# Patient Record
Sex: MALE | URBAN - NONMETROPOLITAN AREA
[De-identification: names, ages, dates, MRNs, and addresses within clinical notes are randomized per-mention and may not be internally consistent; named-entity substitution may affect disease eponyms.]

---

## 2020-10-15 ENCOUNTER — HISTORICAL (OUTPATIENT)
Dept: ADMINISTRATIVE | Facility: HOSPITAL | Age: 65
End: 2020-10-15

## 2022-09-21 ENCOUNTER — HISTORICAL (OUTPATIENT)
Dept: ADMINISTRATIVE | Facility: HOSPITAL | Age: 67
End: 2022-09-21

## 2024-07-13 ENCOUNTER — HOSPITAL ENCOUNTER (INPATIENT)
Facility: HOSPITAL | Age: 69
LOS: 3 days | Discharge: HOME | DRG: 690 | End: 2024-07-16
Attending: SURGERY | Admitting: INTERNAL MEDICINE
Payer: MEDICARE

## 2024-07-13 DIAGNOSIS — R55 NEAR SYNCOPE: ICD-10-CM

## 2024-07-13 DIAGNOSIS — E66.01 CLASS 3 SEVERE OBESITY WITH BODY MASS INDEX (BMI) OF 40.0 TO 44.9 IN ADULT, UNSPECIFIED OBESITY TYPE, UNSPECIFIED WHETHER SERIOUS COMORBIDITY PRESENT: ICD-10-CM

## 2024-07-13 DIAGNOSIS — A41.9 SEPSIS: ICD-10-CM

## 2024-07-13 DIAGNOSIS — A41.9 SEPSIS SECONDARY TO UTI: Primary | ICD-10-CM

## 2024-07-13 DIAGNOSIS — Z90.79 HISTORY OF PROSTATECTOMY: ICD-10-CM

## 2024-07-13 DIAGNOSIS — F17.200 TOBACCO DEPENDENCY: ICD-10-CM

## 2024-07-13 DIAGNOSIS — N39.0 SEPSIS SECONDARY TO UTI: Primary | ICD-10-CM

## 2024-07-13 PROBLEM — I10 HTN (HYPERTENSION): Status: ACTIVE | Noted: 2024-07-13

## 2024-07-13 PROBLEM — E87.6 HYPOKALEMIA: Status: ACTIVE | Noted: 2024-07-13

## 2024-07-13 LAB
ALBUMIN SERPL-MCNC: 4.2 G/DL (ref 3.4–5)
ALBUMIN/GLOB SERPL: 1.2 RATIO
ALP SERPL-CCNC: 68 UNIT/L (ref 50–144)
ALT SERPL-CCNC: 27 UNIT/L (ref 1–45)
ANION GAP SERPL CALC-SCNC: 10 MEQ/L (ref 2–13)
AST SERPL-CCNC: 31 UNIT/L (ref 17–59)
BACTERIA #/AREA URNS AUTO: ABNORMAL /HPF
BASE EXCESS BLD CALC-SCNC: 4 MMOL/L (ref -2–2)
BASOPHILS # BLD AUTO: 0.04 X10(3)/MCL (ref 0.01–0.08)
BASOPHILS NFR BLD AUTO: 0.3 % (ref 0.1–1.2)
BILIRUB SERPL-MCNC: 1.1 MG/DL (ref 0–1)
BILIRUB UR QL STRIP.AUTO: NEGATIVE
BLOOD GAS SAMPLE TYPE (OHS): ABNORMAL
BUN SERPL-MCNC: 19 MG/DL (ref 7–20)
CALCIUM SERPL-MCNC: 9.3 MG/DL (ref 8.4–10.2)
CHLORIDE SERPL-SCNC: 97 MMOL/L (ref 98–110)
CLARITY UR: ABNORMAL
CO2 SERPL-SCNC: 27 MMOL/L (ref 21–32)
COHGB MFR BLDA: 1.6 % (ref 0–1.5)
COLOR UR AUTO: YELLOW
CREAT SERPL-MCNC: 1.14 MG/DL (ref 0.66–1.25)
CREAT/UREA NIT SERPL: 17 (ref 12–20)
EOSINOPHIL # BLD AUTO: 0.01 X10(3)/MCL (ref 0.04–0.54)
EOSINOPHIL NFR BLD AUTO: 0.1 % (ref 0.7–7)
ERYTHROCYTE [DISTWIDTH] IN BLOOD BY AUTOMATED COUNT: 13.5 %
GFR SERPLBLD CREATININE-BSD FMLA CKD-EPI: 69 ML/MIN/1.73/M2
GLOBULIN SER-MCNC: 3.6 GM/DL (ref 2–3.9)
GLUCOSE SERPL-MCNC: 118 MG/DL (ref 70–115)
GLUCOSE UR QL STRIP: NEGATIVE
HCO3 BLDA-SCNC: 27.9 MMOL/L (ref 22–26)
HCT VFR BLD AUTO: 40 % (ref 36–52)
HGB BLD-MCNC: 14.1 G/DL (ref 13–18)
HGB UR QL STRIP: ABNORMAL
IMM GRANULOCYTES # BLD AUTO: 0.06 X10(3)/MCL (ref 0–0.03)
IMM GRANULOCYTES NFR BLD AUTO: 0.5 % (ref 0–0.5)
INHALED O2 CONCENTRATION: 24 %
IP (OHS): 0 CMH2O
KETONES UR QL STRIP: NEGATIVE
LACTATE SERPL-SCNC: 1.1 MMOL/L (ref 0.4–2)
LEUKOCYTE ESTERASE UR QL STRIP: ABNORMAL
LPM (OHS): 1
LYMPHOCYTES # BLD AUTO: 0.84 X10(3)/MCL (ref 1.32–3.57)
LYMPHOCYTES NFR BLD AUTO: 7 % (ref 20–55)
MCH RBC QN AUTO: 29.9 PG (ref 27–34)
MCHC RBC AUTO-ENTMCNC: 35.3 G/DL (ref 31–37)
MCV RBC AUTO: 84.7 FL (ref 79–99)
METHGB MFR BLDA: 0 % (ref 0–1.5)
MODE (OHS): AC
MONOCYTES # BLD AUTO: 0.78 X10(3)/MCL (ref 0.3–0.82)
MONOCYTES NFR BLD AUTO: 6.5 % (ref 4.7–12.5)
NEUTROPHILS # BLD AUTO: 10.21 X10(3)/MCL (ref 1.78–5.38)
NEUTROPHILS NFR BLD AUTO: 85.6 % (ref 37–73)
NITRITE UR QL STRIP: NEGATIVE
NRBC BLD AUTO-RTO: 0 %
OXYGEN DEVICE BLOOD GAS (OHS): ABNORMAL
PAW @ PEAK INSP FLOW SETTING VENT: 0 CMH20
PCO2 BLDA: 44.3 MMHG (ref 35–45)
PH BLDA: 7.42 [PH] (ref 7.35–7.45)
PH UR STRIP: 6 [PH]
PLATELET # BLD AUTO: 161 X10(3)/MCL (ref 140–371)
PMV BLD AUTO: 9.2 FL (ref 9.4–12.4)
PO2 BLDA: 73 MMHG (ref 80–105)
POTASSIUM SERPL-SCNC: 3 MMOL/L (ref 3.5–5.1)
PROT SERPL-MCNC: 7.8 GM/DL (ref 6.3–8.2)
PROT UR QL STRIP: ABNORMAL
RBC # BLD AUTO: 4.72 X10(6)/MCL (ref 4–6)
RBC #/AREA URNS AUTO: ABNORMAL /HPF
SAO2 % BLDA: 95.6 % (ref 95–100)
SODIUM SERPL-SCNC: 134 MMOL/L (ref 136–145)
SP GR UR STRIP.AUTO: 1.02 (ref 1–1.03)
SQUAMOUS #/AREA URNS AUTO: ABNORMAL /HPF
UROBILINOGEN UR STRIP-ACNC: 0.2
WBC # BLD AUTO: 11.94 X10(3)/MCL (ref 4–11.5)
WBC #/AREA URNS AUTO: >100 /HPF

## 2024-07-13 PROCEDURE — 21400001 HC TELEMETRY ROOM

## 2024-07-13 PROCEDURE — 87040 BLOOD CULTURE FOR BACTERIA: CPT | Performed by: SURGERY

## 2024-07-13 PROCEDURE — 93005 ELECTROCARDIOGRAM TRACING: CPT

## 2024-07-13 PROCEDURE — 87086 URINE CULTURE/COLONY COUNT: CPT | Performed by: SURGERY

## 2024-07-13 PROCEDURE — 63600175 PHARM REV CODE 636 W HCPCS: Performed by: SURGERY

## 2024-07-13 PROCEDURE — 99285 EMERGENCY DEPT VISIT HI MDM: CPT | Mod: 25

## 2024-07-13 PROCEDURE — 96361 HYDRATE IV INFUSION ADD-ON: CPT

## 2024-07-13 PROCEDURE — 93010 ELECTROCARDIOGRAM REPORT: CPT | Mod: ,,, | Performed by: INTERNAL MEDICINE

## 2024-07-13 PROCEDURE — 81015 MICROSCOPIC EXAM OF URINE: CPT | Performed by: SURGERY

## 2024-07-13 PROCEDURE — 83605 ASSAY OF LACTIC ACID: CPT | Performed by: SURGERY

## 2024-07-13 PROCEDURE — 99900035 HC TECH TIME PER 15 MIN (STAT)

## 2024-07-13 PROCEDURE — 81003 URINALYSIS AUTO W/O SCOPE: CPT | Performed by: SURGERY

## 2024-07-13 PROCEDURE — 82803 BLOOD GASES ANY COMBINATION: CPT

## 2024-07-13 PROCEDURE — 80053 COMPREHEN METABOLIC PANEL: CPT | Performed by: SURGERY

## 2024-07-13 PROCEDURE — 85025 COMPLETE CBC W/AUTO DIFF WBC: CPT | Performed by: SURGERY

## 2024-07-13 PROCEDURE — 36600 WITHDRAWAL OF ARTERIAL BLOOD: CPT

## 2024-07-13 PROCEDURE — 96374 THER/PROPH/DIAG INJ IV PUSH: CPT

## 2024-07-13 PROCEDURE — 25000003 PHARM REV CODE 250: Performed by: SURGERY

## 2024-07-13 RX ORDER — POTASSIUM CHLORIDE 20 MEQ/1
40 TABLET, EXTENDED RELEASE ORAL ONCE
Status: COMPLETED | OUTPATIENT
Start: 2024-07-13 | End: 2024-07-13

## 2024-07-13 RX ORDER — ACETAMINOPHEN 325 MG/1
650 TABLET ORAL EVERY 6 HOURS PRN
Status: DISCONTINUED | OUTPATIENT
Start: 2024-07-14 | End: 2024-07-16 | Stop reason: HOSPADM

## 2024-07-13 RX ORDER — AMLODIPINE BESYLATE 10 MG/1
10 TABLET ORAL DAILY
COMMUNITY

## 2024-07-13 RX ORDER — LISINOPRIL AND HYDROCHLOROTHIAZIDE 20; 25 MG/1; MG/1
1 TABLET ORAL DAILY
Status: DISCONTINUED | OUTPATIENT
Start: 2024-07-14 | End: 2024-07-14

## 2024-07-13 RX ORDER — DESVENLAFAXINE SUCCINATE 50 MG/1
100 TABLET, EXTENDED RELEASE ORAL DAILY
Status: DISCONTINUED | OUTPATIENT
Start: 2024-07-14 | End: 2024-07-14

## 2024-07-13 RX ORDER — METOPROLOL TARTRATE 50 MG/1
50 TABLET ORAL 2 TIMES DAILY
COMMUNITY

## 2024-07-13 RX ORDER — SODIUM CHLORIDE 9 MG/ML
INJECTION, SOLUTION INTRAVENOUS CONTINUOUS
Status: DISCONTINUED | OUTPATIENT
Start: 2024-07-14 | End: 2024-07-16 | Stop reason: HOSPADM

## 2024-07-13 RX ORDER — IBUPROFEN 400 MG/1
400 TABLET ORAL EVERY 6 HOURS PRN
Status: DISCONTINUED | OUTPATIENT
Start: 2024-07-13 | End: 2024-07-13

## 2024-07-13 RX ORDER — FERROUS SULFATE, DRIED 160(50) MG
1 TABLET, EXTENDED RELEASE ORAL 2 TIMES DAILY WITH MEALS
Status: DISCONTINUED | OUTPATIENT
Start: 2024-07-14 | End: 2024-07-16 | Stop reason: HOSPADM

## 2024-07-13 RX ORDER — LEVOFLOXACIN 5 MG/ML
500 INJECTION, SOLUTION INTRAVENOUS
Status: DISCONTINUED | OUTPATIENT
Start: 2024-07-14 | End: 2024-07-13

## 2024-07-13 RX ORDER — IBUPROFEN 200 MG
1 TABLET ORAL DAILY
Status: DISCONTINUED | OUTPATIENT
Start: 2024-07-14 | End: 2024-07-14

## 2024-07-13 RX ORDER — ONDANSETRON HYDROCHLORIDE 2 MG/ML
4 INJECTION, SOLUTION INTRAVENOUS EVERY 6 HOURS PRN
Status: DISCONTINUED | OUTPATIENT
Start: 2024-07-13 | End: 2024-07-16 | Stop reason: HOSPADM

## 2024-07-13 RX ORDER — DESVENLAFAXINE 100 MG/1
200 TABLET, EXTENDED RELEASE ORAL NIGHTLY
COMMUNITY

## 2024-07-13 RX ORDER — SODIUM CHLORIDE 0.9 % (FLUSH) 0.9 %
3 SYRINGE (ML) INJECTION EVERY 6 HOURS
Status: DISCONTINUED | OUTPATIENT
Start: 2024-07-14 | End: 2024-07-13 | Stop reason: CLARIF

## 2024-07-13 RX ORDER — TALC
6 POWDER (GRAM) TOPICAL NIGHTLY PRN
Status: DISCONTINUED | OUTPATIENT
Start: 2024-07-13 | End: 2024-07-16 | Stop reason: HOSPADM

## 2024-07-13 RX ORDER — AMLODIPINE BESYLATE 5 MG/1
10 TABLET ORAL DAILY
Status: DISCONTINUED | OUTPATIENT
Start: 2024-07-14 | End: 2024-07-16 | Stop reason: HOSPADM

## 2024-07-13 RX ORDER — PHENTERMINE HYDROCHLORIDE 37.5 MG/1
37.5 CAPSULE ORAL EVERY MORNING
COMMUNITY

## 2024-07-13 RX ORDER — MELOXICAM 15 MG/1
15 TABLET ORAL DAILY
COMMUNITY

## 2024-07-13 RX ORDER — ENOXAPARIN SODIUM 100 MG/ML
40 INJECTION SUBCUTANEOUS EVERY 24 HOURS
Status: DISCONTINUED | OUTPATIENT
Start: 2024-07-13 | End: 2024-07-15 | Stop reason: SDUPTHER

## 2024-07-13 RX ORDER — METOPROLOL TARTRATE 50 MG/1
50 TABLET ORAL 2 TIMES DAILY
Status: DISCONTINUED | OUTPATIENT
Start: 2024-07-14 | End: 2024-07-16 | Stop reason: HOSPADM

## 2024-07-13 RX ORDER — MELOXICAM 7.5 MG/1
15 TABLET ORAL DAILY
Status: DISCONTINUED | OUTPATIENT
Start: 2024-07-14 | End: 2024-07-16 | Stop reason: HOSPADM

## 2024-07-13 RX ORDER — FERROUS SULFATE, DRIED 160(50) MG
1 TABLET, EXTENDED RELEASE ORAL 2 TIMES DAILY WITH MEALS
COMMUNITY

## 2024-07-13 RX ORDER — LISINOPRIL AND HYDROCHLOROTHIAZIDE 20; 25 MG/1; MG/1
1 TABLET ORAL DAILY
Status: ON HOLD | COMMUNITY
End: 2024-07-16 | Stop reason: HOSPADM

## 2024-07-13 RX ADMIN — SODIUM CHLORIDE 1000 ML: 9 INJECTION, SOLUTION INTRAVENOUS at 07:07

## 2024-07-13 RX ADMIN — CEFTRIAXONE SODIUM 1 G: 1 INJECTION, POWDER, FOR SOLUTION INTRAMUSCULAR; INTRAVENOUS at 08:07

## 2024-07-13 RX ADMIN — ENOXAPARIN SODIUM 40 MG: 40 INJECTION SUBCUTANEOUS at 11:07

## 2024-07-13 RX ADMIN — SODIUM CHLORIDE 1000 ML: 9 INJECTION, SOLUTION INTRAVENOUS at 08:07

## 2024-07-13 RX ADMIN — POTASSIUM CHLORIDE 40 MEQ: 1500 TABLET, EXTENDED RELEASE ORAL at 07:07

## 2024-07-14 LAB
ABS NEUT CALC (OHS): 7.28 X10(3)/MCL (ref 2.1–9.2)
ANION GAP SERPL CALC-SCNC: 6 MEQ/L (ref 2–13)
BUN SERPL-MCNC: 15 MG/DL (ref 7–20)
CALCIUM SERPL-MCNC: 8.5 MG/DL (ref 8.4–10.2)
CHLORIDE SERPL-SCNC: 102 MMOL/L (ref 98–110)
CO2 SERPL-SCNC: 26 MMOL/L (ref 21–32)
CREAT SERPL-MCNC: 0.91 MG/DL (ref 0.66–1.25)
CREAT/UREA NIT SERPL: 16 (ref 12–20)
ERYTHROCYTE [DISTWIDTH] IN BLOOD BY AUTOMATED COUNT: 13.8 %
GFR SERPLBLD CREATININE-BSD FMLA CKD-EPI: >90 ML/MIN/1.73/M2
GLUCOSE SERPL-MCNC: 156 MG/DL (ref 70–115)
HCT VFR BLD AUTO: 35.9 % (ref 36–52)
HGB BLD-MCNC: 12.5 G/DL (ref 13–18)
LYMPHOCYTES NFR BLD MANUAL: 1.29 X10(3)/MCL
LYMPHOCYTES NFR BLD MANUAL: 15 % (ref 20–55)
MCH RBC QN AUTO: 30.3 PG (ref 27–34)
MCHC RBC AUTO-ENTMCNC: 34.8 G/DL (ref 31–37)
MCV RBC AUTO: 86.9 FL (ref 79–99)
NEUTROPHILS NFR BLD MANUAL: 85 % (ref 37–73)
NRBC BLD AUTO-RTO: 0 %
PLATELET # BLD AUTO: 149 X10(3)/MCL (ref 140–371)
PMV BLD AUTO: 9.4 FL (ref 9.4–12.4)
POTASSIUM SERPL-SCNC: 3.1 MMOL/L (ref 3.5–5.1)
RBC # BLD AUTO: 4.13 X10(6)/MCL (ref 4–6)
SODIUM SERPL-SCNC: 134 MMOL/L (ref 136–145)
WBC # BLD AUTO: 8.57 X10(3)/MCL (ref 4–11.5)

## 2024-07-14 PROCEDURE — 94761 N-INVAS EAR/PLS OXIMETRY MLT: CPT

## 2024-07-14 PROCEDURE — 85025 COMPLETE CBC W/AUTO DIFF WBC: CPT | Performed by: FAMILY MEDICINE

## 2024-07-14 PROCEDURE — S4991 NICOTINE PATCH NONLEGEND: HCPCS | Performed by: INTERNAL MEDICINE

## 2024-07-14 PROCEDURE — 25000003 PHARM REV CODE 250: Performed by: INTERNAL MEDICINE

## 2024-07-14 PROCEDURE — 63600175 PHARM REV CODE 636 W HCPCS: Performed by: SURGERY

## 2024-07-14 PROCEDURE — 25000003 PHARM REV CODE 250: Performed by: FAMILY MEDICINE

## 2024-07-14 PROCEDURE — 27000221 HC OXYGEN, UP TO 24 HOURS

## 2024-07-14 PROCEDURE — 25000003 PHARM REV CODE 250: Performed by: SURGERY

## 2024-07-14 PROCEDURE — 80048 BASIC METABOLIC PNL TOTAL CA: CPT | Performed by: FAMILY MEDICINE

## 2024-07-14 PROCEDURE — 36415 COLL VENOUS BLD VENIPUNCTURE: CPT | Performed by: FAMILY MEDICINE

## 2024-07-14 PROCEDURE — 63600175 PHARM REV CODE 636 W HCPCS: Performed by: FAMILY MEDICINE

## 2024-07-14 PROCEDURE — 99900035 HC TECH TIME PER 15 MIN (STAT)

## 2024-07-14 PROCEDURE — 21400001 HC TELEMETRY ROOM

## 2024-07-14 RX ORDER — POTASSIUM CHLORIDE 20 MEQ/1
40 TABLET, EXTENDED RELEASE ORAL 2 TIMES DAILY
Status: COMPLETED | OUTPATIENT
Start: 2024-07-14 | End: 2024-07-15

## 2024-07-14 RX ORDER — LISINOPRIL 10 MG/1
20 TABLET ORAL DAILY
Status: DISCONTINUED | OUTPATIENT
Start: 2024-07-14 | End: 2024-07-16 | Stop reason: HOSPADM

## 2024-07-14 RX ORDER — DESVENLAFAXINE SUCCINATE 50 MG/1
200 TABLET, EXTENDED RELEASE ORAL NIGHTLY
Status: DISCONTINUED | OUTPATIENT
Start: 2024-07-15 | End: 2024-07-16 | Stop reason: HOSPADM

## 2024-07-14 RX ORDER — HYDROCHLOROTHIAZIDE 25 MG/1
25 TABLET ORAL DAILY
Status: DISCONTINUED | OUTPATIENT
Start: 2024-07-14 | End: 2024-07-15

## 2024-07-14 RX ADMIN — LISINOPRIL 20 MG: 10 TABLET ORAL at 08:07

## 2024-07-14 RX ADMIN — NICOTINE 1 PATCH: 14 PATCH, EXTENDED RELEASE TRANSDERMAL at 08:07

## 2024-07-14 RX ADMIN — POTASSIUM CHLORIDE 40 MEQ: 1500 TABLET, EXTENDED RELEASE ORAL at 06:07

## 2024-07-14 RX ADMIN — SODIUM CHLORIDE: 9 INJECTION, SOLUTION INTRAVENOUS at 10:07

## 2024-07-14 RX ADMIN — ENOXAPARIN SODIUM 40 MG: 40 INJECTION SUBCUTANEOUS at 04:07

## 2024-07-14 RX ADMIN — SODIUM CHLORIDE: 9 INJECTION, SOLUTION INTRAVENOUS at 08:07

## 2024-07-14 RX ADMIN — Medication 1 TABLET: at 08:07

## 2024-07-14 RX ADMIN — METOPROLOL TARTRATE 50 MG: 50 TABLET, FILM COATED ORAL at 08:07

## 2024-07-14 RX ADMIN — ACETAMINOPHEN 650 MG: 325 TABLET, FILM COATED ORAL at 07:07

## 2024-07-14 RX ADMIN — AMLODIPINE BESYLATE 10 MG: 5 TABLET ORAL at 08:07

## 2024-07-14 RX ADMIN — Medication 1 TABLET: at 05:07

## 2024-07-14 RX ADMIN — MELOXICAM 15 MG: 7.5 TABLET ORAL at 08:07

## 2024-07-14 RX ADMIN — HYDROCHLOROTHIAZIDE 25 MG: 25 TABLET ORAL at 08:07

## 2024-07-14 RX ADMIN — SODIUM CHLORIDE: 9 INJECTION, SOLUTION INTRAVENOUS at 12:07

## 2024-07-14 RX ADMIN — ACETAMINOPHEN 650 MG: 325 TABLET, FILM COATED ORAL at 04:07

## 2024-07-14 RX ADMIN — DESVENLAFAXINE 100 MG: 50 TABLET, FILM COATED, EXTENDED RELEASE ORAL at 08:07

## 2024-07-14 RX ADMIN — CEFTRIAXONE SODIUM 1 G: 1 INJECTION, POWDER, FOR SOLUTION INTRAMUSCULAR; INTRAVENOUS at 08:07

## 2024-07-14 NOTE — PLAN OF CARE
Problem: Adult Inpatient Plan of Care  Goal: Plan of Care Review  Outcome: Progressing  Goal: Patient-Specific Goal (Individualized)  Outcome: Progressing  Goal: Absence of Hospital-Acquired Illness or Injury  Outcome: Progressing  Goal: Optimal Comfort and Wellbeing  Outcome: Progressing  Goal: Readiness for Transition of Care  Outcome: Progressing     Problem: Bariatric Environmental Safety  Goal: Safety Maintained with Care  Outcome: Progressing     Problem: Fall Injury Risk  Goal: Absence of Fall and Fall-Related Injury  Outcome: Progressing     Problem: UTI (Urinary Tract Infection)  Goal: Improved Infection Symptoms  Outcome: Progressing    Just admitted this shift to room 210;afebrile;denies pain;urinary frequency noted;meds administered as ordered per md

## 2024-07-14 NOTE — H&P
[facility]    History & Physical      Patient Name: Jonathan Castro  MRN: [id number]  Admission Date: 7/13/2024  Attending Physician: Niocle Colón MD   Primary Care Provider: No primary care provider on file.         Patient information was obtained from patient and ER records.     Subjective:     Principal Problem:<principal problem not specified>    Chief Complaint:   Chief Complaint   Patient presents with    Fever    Urinary Frequency    Loss of Consciousness     Pt presented to ED per POV with c/o FEVER, urinary frequency and urgency and near syncope episode on last night at home. Pt did fall and hit head, pt denies use of blood thinners. Pt has hx of Prostate CA with prostectomy 5 years ago.         HPI: The patient is a 68-year-old man with a history of prostatectomy who presents with fever and hypotension. Blood pressure was initially 80s and 90s cystic on admission. He did get 2 L of fluids with improved his vital signs. Patient has found to have a significant urinary tract infection. Chest x-ray and lactic acid are both negative. He does complain of increased and continence associated with the UTI. He did get Rocephin and gentamycin in the emergency department. Currently feels more comfortable.    Past Medical History:   Diagnosis Date    Hypercholesteremia     Hypertension        Past Surgical History:   Procedure Laterality Date    ANGIOGRAM, [place]      PROSTATECTOMY         Review of patient's allergies indicates:  No Known Allergies    No current facility-administered medications on file prior to encounter.     Current Outpatient Medications on File Prior to Encounter   Medication Sig    amLODIPine (NORVASC) 10 MG tablet Take 10 mg by mouth once daily.    calcium-vitamin D3 (OS-BRAXTON 500 + D3) 500 mg-5 mcg (200 unit) per tablet Take 1 tablet by mouth 2 (two) times daily with meals.    desvenlafaxine succinate (PRISTIQ) 100 MG Tb24 Take 100 mg by mouth once daily.     "lisinopriL-hydrochlorothiazide (PRINZIDE,ZESTORETIC) 20-25 mg Tab Take 1 tablet by mouth once daily.    meloxicam (MOBIC) 15 MG tablet Take 15 mg by mouth once daily.    metoprolol tartrate (LOPRESSOR) 50 MG tablet Take 50 mg by mouth 2 (two) times daily.    phentermine 37.5 MG capsule Take 37.5 mg by mouth every morning.     Family History    None       Tobacco Use    Smoking status: Some Days     Types: Cigars     Passive exposure: Never    Smokeless tobacco: Never   Substance and Sexual Activity    Alcohol use: Yes     Comment: occasional    Drug use: Never    Sexual activity: Not on file     Review of Systems 10 point review of systems is performed and is otherwise negative unless stated as above.   Objective:     Vital Signs (Most Recent):  Temp: 98.1 °F (36.7 °C) (07/13/24 2319)  Pulse: 60 (07/13/24 2248)  Resp: 14 (07/13/24 2248)  BP: 108/64 (07/13/24 2248)  SpO2: 97 % (07/13/24 2248) Vital Signs (24h Range):  Temp:  [98.1 °F (36.7 °C)-98.4 °F (36.9 °C)] 98.1 °F (36.7 °C)  Pulse:  [58-68] 60  Resp:  [14-23] 14  SpO2:  [94 %-97 %] 97 %  BP: ()/(51-64) 108/64     Weight: 124.7 kg (275 lb)  Body mass index is 43.07 kg/m².    Physical Exam   /64   Pulse 60   Temp 98.1 °F (36.7 °C)   Resp 14   Ht 5' 7" (1.702 m)   Wt 124.7 kg (275 lb)   SpO2 97%   BMI 43.07 kg/m²     General Appearance:  Alert, cooperative, no distress, appears stated age   Head:  Normocephalic, without obvious abnormality, atraumatic   Eyes:  PERRL, conjunctiva/corneas clear, EOM's intact, fundi benign, both eyes   Ears:  Normal TM's and external ear canals, both ears   Nose: Nares normal, septum midline, mucosa normal, no drainage or sinus tenderness   Throat: Lips, mucosa, and tongue normal; teeth and gums normal   Neck: Supple, symmetrical, trachea midline, no adenopathy, thyroid: not enlarged, symmetric, no tenderness/mass/nodules, no carotid bruit or JVD   Back:   Symmetric, no curvature, ROM normal, no CVA tenderness "   Lungs:   Clear to auscultation bilaterally, respirations unlabored   Chest Wall:  No tenderness or deformity   Heart:  Regular rate and rhythm, S1, S2 normal, no murmur, rub or gallop   Abdomen:   Soft, non-tender, bowel sounds active all four quadrants,  no masses, no organomegaly   Genitalia:  Normal male   Rectal:  Normal tone, normal prostate, no masses or tenderness;  guaiac negative stool   Extremities: Extremities normal, atraumatic, no cyanosis or edema   Pulses: 2+ and symmetric   Skin: Skin color, texture, turgor normal, no rashes or lesions   Lymph nodes: Cervical, supraclavicular, and axillary nodes normal   Neurologic: Normal         Significant Labs: All pertinent labs within the past 24 hours have been reviewed.  Recent Lab Results         07/13/24 2143 07/13/24 2125 07/13/24 1957 07/13/24  1909        Albumin/Globulin Ratio       1.2       Albumin       4.2       ALP       68       ALT       27       Anion Gap       10.0       Appearance, UA   Cloudy           AST       31       Bacteria, UA   Few           Baso #       0.04       Basophil %       0.3       Bilirubin (UA)   Negative           BILIRUBIN TOTAL       1.1       BUN       19       BUN/CREAT RATIO       17       Calcium       9.3       Chloride       97       CO2       27       Color, UA   Yellow           Creatinine       1.14       eGFR       69  Comment:                      EGFR INTERPRETATION    Beginning 8/15/22 we are reporting the eGFRcr calculation as recommended by the [employer]. The eGFRcr equation has similar overall performance characteristics to the older equation, but the values may differ by more than 10% particularly at higher values of eGFRcr and younger adult ages.    NKF stages of chronic kidney disease (CKD)  Stage 1: Kidney damage with normal or increased eGFR (>90 mL/min/1.73 m^2)  Stage 2: Mild reduction in GFR (60-89 mL/min/1.73 m^2)  Stage 3a: Moderate reduction in GFR (45-59  mL/min/1.73 m^2)  Stage 3b: Moderate reduction in GFR (30-44 mL/min/1.73 m^2)  Stage 4: Severe reduction in GFR (15-29 mL/min/1.73 m^2)  Stage 5: Kidney failure (GFR <15 mL/min/1.73 m^2)           Eos #       0.01       Eos %       0.1       FIO2, Blood gas 24.0             Globulin, Total       3.6       Glucose       118       Glucose, UA   Negative           Hematocrit       40.0       Hemoglobin       14.1       Immature Grans (Abs)       0.06       Immature Granulocytes       0.5       IP 0.0             Ketones, UA   Negative           Lactic Acid Level     1.1         Leukocyte Esterase, UA   Small           LPM 1.0             Lymph #       0.84       LYMPH %       7.0       MCH       29.9       MCHC       35.3       MCV       84.7       MODE AC             Mono #       0.78       Mono %       6.5       MPV       9.2       Neut #       10.21       Neut %       85.6       NITRITE UA   Negative           nRBC       0.0       Blood, UA   Moderate           Oxygen Device, Blood gas Cannula             pH, UA   6.0           PIP 0             Platelet Count       161       Base Excess, Blood gas 4.00             CO Hgb 1.6             POC HCO3 27.9             Met Hgb 0.0             POC PCO2 44.3             POC PH 7.424             POC PO2 73.0             Potassium       3.0       PROTEIN TOTAL       7.8       Protein, UA   Trace           RBC       4.72       RBC, UA   None Seen           RDW       13.5       Sample Type Arterial Blood             sO2, Blood gas 95.6             Sodium       134       Specific Gravity,UA   1.020           Squam Epithel, UA   Occasional           Urobilinogen, UA   0.2           WBC, UA   >100           WBC       11.94               Significant Imaging: I have reviewed all pertinent imaging results/findings within the past 24 hours.  I have reviewed and interpreted all pertinent imaging results/findings within the past 24 hours.    Assessment/Plan:     Active Diagnoses:     Diagnosis Date Noted POA    UTI (urinary tract infection) [N39.0] 07/13/2024 Unknown    Hypokalemia [E87.6] 07/13/2024 Unknown    HTN (hypertension) [I10] 07/13/2024 Unknown     - IV Rocephin  - Follow urine cx  - Replace K  - Reconcile home meds as warranted  - Pain, nausea control, IVF repletion ongoing  - Follow clinical course    This encounter was completed via telemedicine (audio/video) w/ nursing at bedside ot assist w/ clinical exam.  SOC Audio/Visual Equipment is using HIPPA Compliant Web Platform. The patient is located in the hospital and the provider is located off site. This patient is placed in inpatient status under my care.       Participants on Call: Bedside RN, Patient, Physician on Call.   Problems Resolved During this Admission:     VTE Risk Mitigation (From admission, onward)           Ordered     enoxaparin injection 40 mg  Daily         07/13/24 2247     IP VTE HIGH RISK PATIENT  Once         07/13/24 2247                      Damon Boyd MD  Department of Hospital Medicine   [facility]

## 2024-07-14 NOTE — PLAN OF CARE
Problem: Adult Inpatient Plan of Care  Goal: Plan of Care Review  Outcome: Progressing  Goal: Patient-Specific Goal (Individualized)  Outcome: Progressing  Goal: Absence of Hospital-Acquired Illness or Injury  Outcome: Progressing  Goal: Optimal Comfort and Wellbeing  Outcome: Progressing  Goal: Readiness for Transition of Care  Outcome: Progressing     Problem: Bariatric Environmental Safety  Goal: Safety Maintained with Care  Outcome: Progressing     Problem: Fall Injury Risk  Goal: Absence of Fall and Fall-Related Injury  Outcome: Progressing     Problem: UTI (Urinary Tract Infection)  Goal: Improved Infection Symptoms  Outcome: Progressing

## 2024-07-14 NOTE — ED PROVIDER NOTES
Encounter Date: 7/13/2024       History     Chief Complaint   Patient presents with    Fever    Urinary Frequency    Loss of Consciousness     Pt presented to ED per POV with c/o FEVER, urinary frequency and urgency and near syncope episode on last night at home. Pt did fall and hit head, pt denies use of blood thinners. Pt has hx of Prostate CA with prostectomy 5 years ago.      67 YO WM W/ KNOWN PRIOR Hx/o PROSTATECTOMY & POSTOP PROSTATECTOMY URINARY INCONTINENCE PRESENTING W/ ACUTE ONSET URINARY INCONTINENCE FREQUENCY ASSOCIATED W/ FEVER .3.  NO EMESIS.  +NAUSEA.  NO AB PAIN.  +NEAR SYNCOPE W/OUT SYNCOPE.  NO CP.  NO SOB.  NO TRUE VERTIGO.  NO TINNITUS.  NO CHANGE AUDITORY OR VISUAL ACUITY.  +FEVER TREATED WITH ASPIRIN (DISCUSSED NOT USING ASPIRIN WITH FEVER).  +HTN.  +HLD-HCHOL.  NO Hx/o MI/CVA/DVT/PE.        Review of patient's allergies indicates:  No Known Allergies  Past Medical History:   Diagnosis Date    Hypercholesteremia     Hypertension      Past Surgical History:   Procedure Laterality Date    ANGIOGRAM, [place]      PROSTATECTOMY       No family history on file.  Social History     Tobacco Use    Smoking status: Some Days     Types: Cigars     Passive exposure: Never    Smokeless tobacco: Never   Substance Use Topics    Alcohol use: Yes     Comment: occasional    Drug use: Never     Review of Systems   All other systems reviewed and are negative.      Physical Exam     Initial Vitals [07/13/24 1840]   BP Pulse Resp Temp SpO2   (!) 96/51 68 18 98.3 °F (36.8 °C) (!) 94 %      MAP       --         Physical Exam    Nursing note and vitals reviewed.  Constitutional: He appears well-developed and well-nourished.   HENT:   Head: Normocephalic and atraumatic.   Right Ear: External ear normal.   Left Ear: External ear normal.   Nose: Nose normal.   Mouth/Throat: Oropharynx is clear and moist.   Eyes: Conjunctivae and EOM are normal. Pupils are equal, round, and reactive to light.   Neck: Neck supple. No  thyromegaly present. No tracheal deviation present. No JVD present.   Normal range of motion.  Cardiovascular:  Normal rate, regular rhythm, normal heart sounds and intact distal pulses.     Exam reveals no gallop.       No murmur heard.  Pulmonary/Chest: Breath sounds normal. No stridor. No respiratory distress. He has no wheezes. He has no rhonchi. He has no rales.   Abdominal: Abdomen is soft. Bowel sounds are normal. He exhibits no distension. There is no abdominal tenderness. There is no rebound and no guarding.   Musculoskeletal:         General: Normal range of motion.      Cervical back: Normal range of motion and neck supple.     Neurological: He is alert and oriented to person, place, and time. He has normal strength. No cranial nerve deficit or sensory deficit. GCS score is 15. GCS eye subscore is 4. GCS verbal subscore is 5. GCS motor subscore is 6.   Skin: Skin is warm. Capillary refill takes less than 2 seconds.   Psychiatric: He has a normal mood and affect. His behavior is normal. Judgment and thought content normal.         ED Course   Procedures  Labs Reviewed   URINALYSIS, REFLEX TO URINE CULTURE - Abnormal; Notable for the following components:       Result Value    Appearance, UA Cloudy (*)     Protein, UA Trace (*)     Blood, UA Moderate (*)     Leukocyte Esterase, UA Small (*)     All other components within normal limits    Narrative:      URINE STABILITY IS 2 HOURS AT ROOM TEMP OR    SIX HOURS REFRIGERATED. PERFORMING TESTING ON    SPECIMENS GREATER THAN THIS AGE MAY AFFECT THE    FOLLOWING TESTS:    PH          SPECIFIC GRAVITY           BLOOD    CLARITY     BILIRUBIN               UROBILINOGEN   COMPREHENSIVE METABOLIC PANEL - Abnormal; Notable for the following components:    Sodium 134 (*)     Potassium 3.0 (*)     Chloride 97 (*)     Glucose 118 (*)     Bilirubin Total 1.1 (*)     All other components within normal limits   CBC WITH DIFFERENTIAL - Abnormal; Notable for the following  components:    WBC 11.94 (*)     MPV 9.2 (*)     Neut % 85.6 (*)     Lymph % 7.0 (*)     Eos % 0.1 (*)     Lymph # 0.84 (*)     Neut # 10.21 (*)     Eos # 0.01 (*)     IG# 0.06 (*)     All other components within normal limits   BLOOD GAS - Abnormal; Notable for the following components:    pO2, Blood gas 73.0 (*)     CO Hgb 1.6 (*)     HCO3, Blood gas 27.9 (*)     Base Excess, Blood gas 4.00 (*)     All other components within normal limits   URINALYSIS, MICROSCOPIC - Abnormal; Notable for the following components:    Bacteria, UA Few (*)     WBC, UA >100 (*)     All other components within normal limits   LACTIC ACID, PLASMA - Normal   BLOOD CULTURE [facility]   BLOOD CULTURE [facility]   CULTURE, URINE   CBC W/ AUTO DIFFERENTIAL    Narrative:     The following orders were created for panel order CBC auto differential.  Procedure                               Abnormality         Status                     ---------                               -----------         ------                     CBC with Differential[[id number]]       Abnormal            Final result                 Please view results for these tests on the individual orders.          Imaging Results              X-Ray Chest AP Portable (Final result)  Result time 07/13/24 20:24:01      Final result by Thomas Ritter MD (07/13/24 20:24:01)                   Impression:      Shallow depth of inspiration exaggerating the interstitial markings, the cardiac silhouette as well as the pulmonary vasculature.  This also precludes the exclusion of mild interstitial edema.      Electronically signed by: Thomas Ritter  Date:    07/13/2024  Time:    20:24               Narrative:    EXAMINATION:  XR CHEST AP PORTABLE    CLINICAL HISTORY:  FEVER W/ HYPOXIA;    TECHNIQUE:  Single frontal view of the chest was performed.    COMPARISON:  07/21/2022    FINDINGS:  The lungs are under expanded and there is exaggeration to the interstitial markings and the cardiac silhouette as  well as the pulmonary vasculature.No focal, lobar or segmental infiltrate or significant pleural effusion or pneumothorax is identified.    Moderate degenerative findings involve the spine.                                       Medications   gentamicin (GARAMYCIN) 450 mg in 0.9% NaCl 100 mL IVPB (has no administration in time range)   sodium chloride 0.9% flush 3 mL (has no administration in time range)   enoxaparin injection 40 mg (has no administration in time range)   ibuprofen tablet 400 mg (has no administration in time range)   ondansetron injection 4 mg (has no administration in time range)   melatonin tablet 6 mg (has no administration in time range)   0.9%  NaCl infusion (has no administration in time range)   levoFLOXacin 500 mg/100 mL IVPB 500 mg (has no administration in time range)   sodium chloride 0.9% bolus 1,000 mL 1,000 mL (0 mLs Intravenous Stopped 7/13/24 2013)   sodium chloride 0.9% bolus 1,000 mL 1,000 mL (0 mLs Intravenous Stopped 7/13/24 2145)   cefTRIAXone (Rocephin) 1 g in D5W 100 mL IVPB (MB+) (0 g Intravenous Stopped 7/13/24 2010)   potassium chloride SA CR tablet 40 mEq (40 mEq Oral Given 7/1953)     Medical Decision Making             ED Course as of 07/13/24 2248   Sat Jul 13, 2024 2008 ROOM AIR OXYGEN LEVEL 93%   [WV]   2214 116/62   [WV]   2243 DR ROMAN AGREES WITH ADMISSION [WV]      ED Course User Index  [WV] Myron Douglas MD                       TOTAL CRITICAL CARE TIME:  90 minutes    Clinical Impression:  Final diagnoses:  [A41.9, N39.0] Sepsis secondary to UTI (Primary)  [E66.01, Z68.41] Class 3 severe obesity with body mass index (BMI) of 40.0 to 44.9 in adult, unspecified obesity type, unspecified whether serious comorbidity present  [Z90.79] History of prostatectomy          ED Disposition Condition    Admit Stable                Myron Douglas MD  07/13/24 5360

## 2024-07-14 NOTE — PROGRESS NOTES
Hospital Medicine  Progress Note    Patient Name: Jonathan Castro  MRN: [id number]  Status: IP- Inpatient   Admission Date: 7/13/2024  Length of Stay: 1  Date of Service: 07/14/2024       CC: hospital follow-up for        SUBJECTIVE   68-year-old man with a history of prostatectomy who presents with fever and hypotension. Blood pressure was initially 80s and 90s cystic on admission. He did get 2 L of fluids with improved his vital signs. Patient has found to have a significant urinary tract infection. Chest x-ray and lactic acid are both negative. He does complain of increased and continence associated with the UTI. He did get Rocephin and gentamycin in the emergency department. Currently feels more comfortable.     07/14/2024  BP better   Culture pending  On iv rocephin for UTI    Today: Patient seen and examined at bedside, and chart reviewed.       MEDICATIONS   Scheduled   amLODIPine  10 mg Oral Daily    calcium-vitamin D3  1 tablet Oral BID WM    cefTRIAXone (Rocephin) IV (PEDS and ADULTS)  1 g Intravenous Q24H    desvenlafaxine succinate  100 mg Oral Daily    enoxparin  40 mg Subcutaneous Daily    hydroCHLOROthiazide  25 mg Oral Daily    lisinopriL  20 mg Oral Daily    meloxicam  15 mg Oral Daily    metoprolol tartrate  50 mg Oral BID    nicotine  1 patch Transdermal Daily     Continuous Infusions   0.9% NaCl   Intravenous Continuous 125 mL/hr at 07/14/24 0823 New Bag at 07/14/24 0823         PHYSICAL EXAM   VITALS: T 98.5 °F (36.9 °C)   /69   P (!) 54   RR 18   O2 (!) 94 %    GENERAL: Awake and in NAD  LUNGS: CTA B/L  CVS: Normal rate  GI/: Soft, NT, bowel sounds positive.  EXTREMITIES: No peripheral edema  NEURO: AAOx3  PSYCH: Cooperative      LABS   CBC  Recent Labs     07/13/24 1909 07/14/24  0902   WBC 11.94* 8.57   RBC 4.72 4.13   HGB 14.1 12.5*   HCT 40.0 35.9*   MCV 84.7 86.9   MCH 29.9 30.3   MCHC 35.3 34.8   RDW 13.5 13.8    149     CHEM  Recent Labs     07/13/24 1909 07/14/24  0902    * 134*   K 3.0* 3.1*   CO2 27 26   BUN 19 15   CREATININE 1.14 0.91   GLUCOSE 118* 156*   CALCIUM 9.3 8.5   ALBUMIN 4.2  --    GLOBULIN 3.6  --    ALKPHOS 68  --    ALT 27  --    AST 31  --    BILITOT 1.1*  --          MICROBIOLOGY     Microbiology Results (last 7 days)       Procedure Component Value Units Date/Time    Urine culture [[id number]] Collected: 07/13/24 2125    Order Status: Sent Specimen: Urine Updated: 07/13/24 2221    Blood Culture [[id number]] Collected: 07/13/24 1957    Order Status: Resulted Specimen: Blood from Ankle, Left Updated: 07/13/24 2025    Blood Culture [[id number]] Collected: 07/13/24 1957    Order Status: Resulted Specimen: Blood from Arm, Left Updated: 07/13/24 2024              DIAGNOSTICS   X-Ray Chest AP Portable  Narrative: EXAMINATION:  XR CHEST AP PORTABLE    CLINICAL HISTORY:  FEVER W/ HYPOXIA;    TECHNIQUE:  Single frontal view of the chest was performed.    COMPARISON:  07/21/2022    FINDINGS:  The lungs are under expanded and there is exaggeration to the interstitial markings and the cardiac silhouette as well as the pulmonary vasculature.No focal, lobar or segmental infiltrate or significant pleural effusion or pneumothorax is identified.    Moderate degenerative findings involve the spine.  Impression: Shallow depth of inspiration exaggerating the interstitial markings, the cardiac silhouette as well as the pulmonary vasculature.  This also precludes the exclusion of mild interstitial edema.    Electronically signed by: Thomas Ritter  Date:    07/13/2024  Time:    20:24        ASSESSMENT      UTI (urinary tract infection) [N39.0] 07/13/2024 Unknown    Hypokalemia [E87.6] 07/13/2024 Unknown    HTN (hypertension) [I10] 07/13/2024 Unknown     PLAN     - cont IV Rocephin  - Follow urine cx  - Replace K  - Reconcile home meds as warranted  - Pain, nausea control, IVF repletion ongoing  - Follow clinical course      Prophylaxis: babar Das,  [facility]

## 2024-07-15 LAB
ANION GAP SERPL CALC-SCNC: 8 MEQ/L (ref 2–13)
BASOPHILS # BLD AUTO: 0.04 X10(3)/MCL (ref 0.01–0.08)
BASOPHILS NFR BLD AUTO: 0.4 % (ref 0.1–1.2)
BUN SERPL-MCNC: 13 MG/DL (ref 7–20)
CALCIUM SERPL-MCNC: 8.7 MG/DL (ref 8.4–10.2)
CHLORIDE SERPL-SCNC: 101 MMOL/L (ref 98–110)
CO2 SERPL-SCNC: 27 MMOL/L (ref 21–32)
CREAT SERPL-MCNC: 0.69 MG/DL (ref 0.66–1.25)
CREAT/UREA NIT SERPL: 19 (ref 12–20)
EOSINOPHIL # BLD AUTO: 0.22 X10(3)/MCL (ref 0.04–0.54)
EOSINOPHIL NFR BLD AUTO: 2.3 % (ref 0.7–7)
ERYTHROCYTE [DISTWIDTH] IN BLOOD BY AUTOMATED COUNT: 14.2 %
GFR SERPLBLD CREATININE-BSD FMLA CKD-EPI: >90 ML/MIN/1.73/M2
GLUCOSE SERPL-MCNC: 99 MG/DL (ref 70–115)
HCT VFR BLD AUTO: 37.9 % (ref 36–52)
HGB BLD-MCNC: 13.5 G/DL (ref 13–18)
IMM GRANULOCYTES # BLD AUTO: 0.04 X10(3)/MCL (ref 0–0.03)
IMM GRANULOCYTES NFR BLD AUTO: 0.4 % (ref 0–0.5)
LYMPHOCYTES # BLD AUTO: 1.96 X10(3)/MCL (ref 1.32–3.57)
LYMPHOCYTES NFR BLD AUTO: 20.5 % (ref 20–55)
MAGNESIUM SERPL-MCNC: 1.9 MG/DL (ref 1.8–2.4)
MCH RBC QN AUTO: 31.6 PG (ref 27–34)
MCHC RBC AUTO-ENTMCNC: 35.6 G/DL (ref 31–37)
MCV RBC AUTO: 88.8 FL (ref 79–99)
MONOCYTES # BLD AUTO: 0.96 X10(3)/MCL (ref 0.3–0.82)
MONOCYTES NFR BLD AUTO: 10.1 % (ref 4.7–12.5)
NEUTROPHILS # BLD AUTO: 6.33 X10(3)/MCL (ref 1.78–5.38)
NEUTROPHILS NFR BLD AUTO: 66.3 % (ref 37–73)
NRBC BLD AUTO-RTO: 0 %
OHS QRS DURATION: 96 MS
OHS QRS DURATION: 98 MS
OHS QTC CALCULATION: 410 MS
OHS QTC CALCULATION: 464 MS
PLATELET # BLD AUTO: 163 X10(3)/MCL (ref 140–371)
PLATELET # BLD EST: ADEQUATE 10*3/UL
PMV BLD AUTO: 10.3 FL (ref 9.4–12.4)
POTASSIUM SERPL-SCNC: 3.5 MMOL/L (ref 3.5–5.1)
RBC # BLD AUTO: 4.27 X10(6)/MCL (ref 4–6)
SODIUM SERPL-SCNC: 136 MMOL/L (ref 136–145)
WBC # BLD AUTO: 9.55 X10(3)/MCL (ref 4–11.5)

## 2024-07-15 PROCEDURE — 25000003 PHARM REV CODE 250: Performed by: FAMILY MEDICINE

## 2024-07-15 PROCEDURE — 36415 COLL VENOUS BLD VENIPUNCTURE: CPT | Performed by: FAMILY MEDICINE

## 2024-07-15 PROCEDURE — 85025 COMPLETE CBC W/AUTO DIFF WBC: CPT | Performed by: FAMILY MEDICINE

## 2024-07-15 PROCEDURE — 93010 ELECTROCARDIOGRAM REPORT: CPT | Mod: ,,, | Performed by: INTERNAL MEDICINE

## 2024-07-15 PROCEDURE — 63600175 PHARM REV CODE 636 W HCPCS: Performed by: FAMILY MEDICINE

## 2024-07-15 PROCEDURE — 80048 BASIC METABOLIC PNL TOTAL CA: CPT | Performed by: FAMILY MEDICINE

## 2024-07-15 PROCEDURE — 27000221 HC OXYGEN, UP TO 24 HOURS

## 2024-07-15 PROCEDURE — 83735 ASSAY OF MAGNESIUM: CPT | Performed by: INTERNAL MEDICINE

## 2024-07-15 PROCEDURE — 21400001 HC TELEMETRY ROOM

## 2024-07-15 PROCEDURE — 93005 ELECTROCARDIOGRAM TRACING: CPT

## 2024-07-15 PROCEDURE — 94761 N-INVAS EAR/PLS OXIMETRY MLT: CPT

## 2024-07-15 PROCEDURE — 25000003 PHARM REV CODE 250: Performed by: INTERNAL MEDICINE

## 2024-07-15 RX ORDER — POTASSIUM CHLORIDE 20 MEQ/1
40 TABLET, EXTENDED RELEASE ORAL ONCE
Status: DISCONTINUED | OUTPATIENT
Start: 2024-07-15 | End: 2024-07-16 | Stop reason: HOSPADM

## 2024-07-15 RX ORDER — ATORVASTATIN CALCIUM 10 MG/1
10 TABLET, FILM COATED ORAL NIGHTLY
COMMUNITY

## 2024-07-15 RX ADMIN — MELOXICAM 15 MG: 7.5 TABLET ORAL at 08:07

## 2024-07-15 RX ADMIN — POTASSIUM CHLORIDE 40 MEQ: 1500 TABLET, EXTENDED RELEASE ORAL at 08:07

## 2024-07-15 RX ADMIN — CEFTRIAXONE SODIUM 1 G: 1 INJECTION, POWDER, FOR SOLUTION INTRAMUSCULAR; INTRAVENOUS at 08:07

## 2024-07-15 RX ADMIN — SODIUM CHLORIDE: 9 INJECTION, SOLUTION INTRAVENOUS at 08:07

## 2024-07-15 RX ADMIN — ACETAMINOPHEN 650 MG: 325 TABLET, FILM COATED ORAL at 07:07

## 2024-07-15 RX ADMIN — APIXABAN 5 MG: 5 TABLET, FILM COATED ORAL at 08:07

## 2024-07-15 RX ADMIN — DESVENLAFAXINE 200 MG: 50 TABLET, FILM COATED, EXTENDED RELEASE ORAL at 08:07

## 2024-07-15 RX ADMIN — Medication 1 TABLET: at 08:07

## 2024-07-15 RX ADMIN — AMLODIPINE BESYLATE 10 MG: 5 TABLET ORAL at 08:07

## 2024-07-15 RX ADMIN — LISINOPRIL 20 MG: 10 TABLET ORAL at 08:07

## 2024-07-15 RX ADMIN — METOPROLOL TARTRATE 50 MG: 50 TABLET, FILM COATED ORAL at 08:07

## 2024-07-15 RX ADMIN — Medication 1 TABLET: at 06:07

## 2024-07-15 RX ADMIN — ACETAMINOPHEN 650 MG: 325 TABLET, FILM COATED ORAL at 08:07

## 2024-07-15 RX ADMIN — HYDROCHLOROTHIAZIDE 25 MG: 25 TABLET ORAL at 08:07

## 2024-07-15 NOTE — HPI
68-year-old man with a history of prostatectomy who presents with fever and hypotension. Blood pressure was initially 80s and 90s cystic on admission. He did get 2 L of fluids with improved his vital signs. Patient has found to have a significant urinary tract infection. Chest x-ray and lactic acid are both negative. He does complain of increased and continence associated with the UTI. He did get Rocephin and gentamycin in the emergency department. Currently feels more comfortable.

## 2024-07-15 NOTE — PLAN OF CARE
Problem: Adult Inpatient Plan of Care  Goal: Plan of Care Review  Outcome: Progressing  Goal: Patient-Specific Goal (Individualized)  Outcome: Progressing  Goal: Absence of Hospital-Acquired Illness or Injury  Outcome: Progressing  Goal: Optimal Comfort and Wellbeing  Outcome: Progressing  Goal: Readiness for Transition of Care  Outcome: Progressing     Problem: Bariatric Environmental Safety  Goal: Safety Maintained with Care  Outcome: Progressing     Problem: Fall Injury Risk  Goal: Absence of Fall and Fall-Related Injury  Outcome: Progressing     Problem: UTI (Urinary Tract Infection)  Goal: Improved Infection Symptoms  Outcome: Progressing    Afebrile tonight;no problems with urination;bradycardic on telemetry during this shift 40's-50's majority of the night while sleeping;slept well;meds administered as ordered per md

## 2024-07-15 NOTE — HOSPITAL COURSE
07/14/2024  BP better   Culture pending  On iv rocephin for UTI    07/15/2024 admitted with UTI, 24 hr urnine negative, UA looks abnormal, still running fever early this am, now afebrile.    Has had 2 doses of rocephin     Today: Patient seen and examined at bedside, and chart reviewed

## 2024-07-15 NOTE — ASSESSMENT & PLAN NOTE
Chronic, controlled. Latest blood pressure and vitals reviewed-     Temp:  [97.1 °F (36.2 °C)-101.6 °F (38.7 °C)]   Pulse:  [45-81]   Resp:  [18-20]   BP: (111-136)/(60-70)   SpO2:  [92 %-97 %] .   Home meds for hypertension were reviewed and noted below.   Hypertension Medications               amLODIPine (NORVASC) 10 MG tablet Take 10 mg by mouth once daily.    lisinopriL-hydrochlorothiazide (PRINZIDE,ZESTORETIC) 20-25 mg Tab Take 1 tablet by mouth once daily.    metoprolol tartrate (LOPRESSOR) 50 MG tablet Take 50 mg by mouth 2 (two) times daily.            While in the hospital, will manage blood pressure as follows; Continue home antihypertensive regimen    Will utilize p.r.n. blood pressure medication only if patient's blood pressure greater than 140/90 and he develops symptoms such as worsening chest pain or shortness of breath.

## 2024-07-15 NOTE — ASSESSMENT & PLAN NOTE
Patient has hypokalemia which is Acute and currently improving. Most recent potassium levels reviewed-   Lab Results   Component Value Date    K 3.5 07/15/2024   . Will continue potassium replacement per protocol and recheck repeat levels after replacement completed.

## 2024-07-15 NOTE — SUBJECTIVE & OBJECTIVE
Past Medical History:   Diagnosis Date    Hypercholesteremia     Hypertension     Sleep apnea, unspecified        Past Surgical History:   Procedure Laterality Date    ANGIOGRAM, [place]      CATARACT EXTRACTION      PROSTATECTOMY         Review of patient's allergies indicates:  No Known Allergies    No current facility-administered medications on file prior to encounter.     Current Outpatient Medications on File Prior to Encounter   Medication Sig    amLODIPine (NORVASC) 10 MG tablet Take 10 mg by mouth once daily.    calcium-vitamin D3 (OS-BRAXTON 500 + D3) 500 mg-5 mcg (200 unit) per tablet Take 1 tablet by mouth 2 (two) times daily with meals.    desvenlafaxine succinate (PRISTIQ) 100 MG Tb24 Take 200 mg by mouth nightly.    lisinopriL-hydrochlorothiazide (PRINZIDE,ZESTORETIC) 20-25 mg Tab Take 1 tablet by mouth once daily.    meloxicam (MOBIC) 15 MG tablet Take 15 mg by mouth once daily.    metoprolol tartrate (LOPRESSOR) 50 MG tablet Take 50 mg by mouth 2 (two) times daily.    phentermine 37.5 MG capsule Take 37.5 mg by mouth every morning.     Family History    None       Tobacco Use    Smoking status: Some Days     Types: Cigars     Passive exposure: Never    Smokeless tobacco: Never   Substance and Sexual Activity    Alcohol use: Yes     Comment: occasional    Drug use: Never    Sexual activity: Not on file     Review of Systems   Constitutional: Positive for fever and malaise/fatigue.   HENT: Negative.     Eyes: Negative.    Cardiovascular: Negative.    Respiratory: Negative.     Endocrine: Negative.    Hematologic/Lymphatic: Negative.    Skin: Negative.    Musculoskeletal:  Positive for arthritis.   Gastrointestinal: Negative.    Genitourinary:  Positive for dysuria.   Neurological:  Positive for weakness.   Psychiatric/Behavioral: Negative.     Allergic/Immunologic: Negative.      Objective:     Vital Signs (Most Recent):  Temp: 98.8 °F (37.1 °C) (07/15/24 1045)  Pulse: 68 (07/15/24 1045)  Resp: 18 (07/15/24  1045)  BP: 119/64 (07/15/24 1045)  SpO2: 95 % (07/15/24 1045) Vital Signs (24h Range):  Temp:  [97.1 °F (36.2 °C)-101.6 °F (38.7 °C)] 98.8 °F (37.1 °C)  Pulse:  [45-81] 68  Resp:  [18-20] 18  SpO2:  [92 %-97 %] 95 %  BP: (111-138)/(60-73) 119/64     Weight: 124.6 kg (274 lb 9.6 oz)  Body mass index is 43.01 kg/m².    SpO2: 95 %         Intake/Output Summary (Last 24 hours) at 7/15/2024 1258  Last data filed at 7/15/2024 0558  Gross per 24 hour   Intake 2386.25 ml   Output --   Net 2386.25 ml       Lines/Drains/Airways       Peripheral Intravenous Line  Duration                  Peripheral IV - Single Lumen 07/14/24 1228 22 G Anterior;Right Forearm 1 day                     Physical Exam  Constitutional:       General: He is not in acute distress.  HENT:      Head: Normocephalic and atraumatic.      Nose: No rhinorrhea.      Mouth/Throat:      Mouth: Mucous membranes are moist.   Eyes:      Extraocular Movements: Extraocular movements intact.      Pupils: Pupils are equal, round, and reactive to light.   Cardiovascular:      Rate and Rhythm: Normal rate.   Pulmonary:      Effort: Pulmonary effort is normal.      Breath sounds: Normal breath sounds.   Abdominal:      Palpations: Abdomen is soft.      Tenderness: There is no abdominal tenderness.   Musculoskeletal:      Cervical back: Neck supple.      Right lower leg: Edema present.      Left lower leg: Edema present.   Skin:     General: Skin is warm and dry.   Neurological:      General: No focal deficit present.   Psychiatric:         Mood and Affect: Mood normal.         Behavior: Behavior normal.          Significant Labs: CMP   Recent Labs   Lab 07/13/24  1909 07/14/24  0902 07/15/24  0502   * 134* 136   K 3.0* 3.1* 3.5   CL 97* 102 101   CO2 27 26 27   BUN 19 15 13   CREATININE 1.14 0.91 0.69   CALCIUM 9.3 8.5 8.7   ALBUMIN 4.2  --   --    BILITOT 1.1*  --   --    ALKPHOS 68  --   --    AST 31  --   --    ALT 27  --   --     and CBC   Recent Labs   Lab  07/13/24  1909 07/14/24  0902 07/15/24  0502   WBC 11.94* 8.57 9.55   HGB 14.1 12.5* 13.5   HCT 40.0 35.9* 37.9    149 163

## 2024-07-15 NOTE — ASSESSMENT & PLAN NOTE
Still with fever  WBC coming down and urine cx negative so far  No other symtpoms  Continue rocephin if additional fever will repeat blood cx and consider change iv abx

## 2024-07-15 NOTE — SUBJECTIVE & OBJECTIVE
Interval History:      Review of Systems   Constitutional:  Negative for appetite change, fatigue and fever.   Respiratory:  Negative for cough, shortness of breath and wheezing.    Cardiovascular:  Negative for chest pain and leg swelling.   Gastrointestinal:  Negative for abdominal distention, abdominal pain, constipation, diarrhea, nausea and vomiting.   Skin:  Negative for color change, pallor, rash and wound.   Neurological:  Negative for tremors, syncope and headaches.   Psychiatric/Behavioral:  Negative for agitation and behavioral problems.      Objective:     Vital Signs (Most Recent):  Temp: 99.4 °F (37.4 °C) (07/15/24 1500)  Pulse: 62 (07/15/24 1500)  Resp: 18 (07/15/24 1500)  BP: 123/65 (07/15/24 1500)  SpO2: (!) 94 % (07/15/24 1500) Vital Signs (24h Range):  Temp:  [97.1 °F (36.2 °C)-101.6 °F (38.7 °C)] 99.4 °F (37.4 °C)  Pulse:  [45-81] 62  Resp:  [18-20] 18  SpO2:  [92 %-97 %] 94 %  BP: (111-136)/(60-70) 123/65     Weight: 124.6 kg (274 lb 9.6 oz)  Body mass index is 43.01 kg/m².    Intake/Output Summary (Last 24 hours) at 7/15/2024 1543  Last data filed at 7/15/2024 1200  Gross per 24 hour   Intake 3346.25 ml   Output --   Net 3346.25 ml         Physical Exam  Constitutional:       General: He is not in acute distress.     Appearance: Normal appearance. He is normal weight. He is not ill-appearing.   HENT:      Head: Normocephalic and atraumatic.      Nose: Nose normal.   Eyes:      General: No scleral icterus.     Conjunctiva/sclera: Conjunctivae normal.   Cardiovascular:      Rate and Rhythm: Normal rate and regular rhythm.      Pulses: Normal pulses.      Heart sounds: Normal heart sounds.   Pulmonary:      Effort: Pulmonary effort is normal.      Breath sounds: Normal breath sounds.   Abdominal:      General: Abdomen is flat. Bowel sounds are normal.      Palpations: Abdomen is soft.   Skin:     General: Skin is warm and dry.      Findings: No erythema or rash.   Neurological:      General: No  focal deficit present.      Mental Status: He is alert and oriented to person, place, and time.   Psychiatric:         Mood and Affect: Mood normal.         Behavior: Behavior normal.         Thought Content: Thought content normal.             Significant Labs: All pertinent labs within the past 24 hours have been reviewed.  CBC:   Recent Labs   Lab 07/13/24 1909 07/14/24  0902 07/15/24  0502   WBC 11.94* 8.57 9.55   HGB 14.1 12.5* 13.5   HCT 40.0 35.9* 37.9    149 163     CMP:   Recent Labs   Lab 07/13/24 1909 07/14/24  0902 07/15/24  0502   * 134* 136   K 3.0* 3.1* 3.5   CL 97* 102 101   CO2 27 26 27   BUN 19 15 13   CREATININE 1.14 0.91 0.69   CALCIUM 9.3 8.5 8.7   ALBUMIN 4.2  --   --    BILITOT 1.1*  --   --    ALKPHOS 68  --   --    AST 31  --   --    ALT 27  --   --        Significant Imaging: I have reviewed all pertinent imaging results/findings within the past 24 hours.

## 2024-07-15 NOTE — NURSING
Noted telemetry strip was interpreted as afib;asked patient if he has a history of afib;he denies;EKG done & resulted as afib with slow ventricular response;telemed request sent

## 2024-07-15 NOTE — PLAN OF CARE
07/15/24 1250   Discharge Assessment   Assessment Type Discharge Planning Assessment   Confirmed/corrected address, phone number and insurance Yes   Confirmed Demographics Correct on Facesheet   Source of Information patient   When was your last doctors appointment?   (2 weeks ago)   Communicated NANCY with patient/caregiver Date not available/Unable to determine   Reason For Admission UTI   People in Home spouse   Facility Arrived From: home   Do you expect to return to your current living situation? Yes   Do you have help at home or someone to help you manage your care at home? Yes   Who are your caregiver(s) and their phone number(s)? wife Olga Castro [phone]   Prior to hospitilization cognitive status: Alert/Oriented   Current cognitive status: Alert/Oriented   Walking or Climbing Stairs Difficulty no   Dressing/Bathing Difficulty no   Home Layout Able to live on 1st floor   Equipment Currently Used at Home none   Readmission within 30 days? No   Patient currently being followed by outpatient case management? No   Do you currently have service(s) that help you manage your care at home? No   Do you take prescription medications? Yes   Do you have prescription coverage? Yes   Coverage [employer] [facility]   Do you have any problems affording any of your prescribed medications? No   Is the patient taking medications as prescribed? yes   Who is going to help you get home at discharge? wife   How do you get to doctors appointments? car, drives self   Are you on dialysis? No   Do you take coumadin? No   Discharge Plan A Home   DME Needed Upon Discharge  none   Discharge Plan discussed with: Patient   Transition of Care Barriers None   Physical Activity   On average, how many days per week do you engage in moderate to strenuous exercise (like a brisk walk)? 0 days   On average, how many minutes do you engage in exercise at this level? 0 min   Financial Resource Strain   How hard is it for you to pay for the very basics  like food, housing, medical care, and heating? Not hard   Housing Stability   In the last 12 months, was there a time when you were not able to pay the mortgage or rent on time? N   At any time in the past 12 months, were you homeless or living in a shelter (including now)? N   Transportation Needs   Has the lack of transportation kept you from medical appointments, meetings, work or from getting things needed for daily living? No   Food Insecurity   Within the past 12 months, you worried that your food would run out before you got the money to buy more. Never true   Within the past 12 months, the food you bought just didn't last and you didn't have money to get more. Never true   Stress   Do you feel stress - tense, restless, nervous, or anxious, or unable to sleep at night because your mind is troubled all the time - these days? Not at all   Social Isolation   How often do you feel lonely or isolated from those around you?  Never   Alcohol Use   Q1: How often do you have a drink containing alcohol? Never   Q2: How many drinks containing alcohol do you have on a typical day when you are drinking? None   Q3: How often do you have six or more drinks on one occasion? Never   Utilities   In the past 12 months has the electric, gas, oil, or water company threatened to shut off services in your home? No   Health Literacy   How often do you need to have someone help you when you read instructions, pamphlets, or other written material from your doctor or pharmacy? Never

## 2024-07-15 NOTE — PROGRESS NOTES
[facility]  Progress Note    Patient Name: Jonathan Castro  MRN: [id number]  Patient Class: IP- Inpatient   Admission Date: 7/13/2024  Length of Stay: 2 days  Attending Physician: Nicole Colón MD  Primary Care Provider: Dave Gomez MD        Subjective:     Principal Problem:<principal problem not specified>        HPI:  68-year-old man with a history of prostatectomy who presents with fever and hypotension. Blood pressure was initially 80s and 90s cystic on admission. He did get 2 L of fluids with improved his vital signs. Patient has found to have a significant urinary tract infection. Chest x-ray and lactic acid are both negative. He does complain of increased and continence associated with the UTI. He did get Rocephin and gentamycin in the emergency department. Currently feels more comfortable.           Overview/Hospital Course:  07/14/2024  BP better   Culture pending  On iv rocephin for UTI    07/15/2024 admitted with UTI, 24 hr urnine negative, UA looks abnormal, still running fever early this am, now afebrile.    Has had 2 doses of rocephin     Today: Patient seen and examined at bedside, and chart reviewed    Interval History:      Review of Systems   Constitutional:  Negative for appetite change, fatigue and fever.   Respiratory:  Negative for cough, shortness of breath and wheezing.    Cardiovascular:  Negative for chest pain and leg swelling.   Gastrointestinal:  Negative for abdominal distention, abdominal pain, constipation, diarrhea, nausea and vomiting.   Skin:  Negative for color change, pallor, rash and wound.   Neurological:  Negative for tremors, syncope and headaches.   Psychiatric/Behavioral:  Negative for agitation and behavioral problems.      Objective:     Vital Signs (Most Recent):  Temp: 99.4 °F (37.4 °C) (07/15/24 1500)  Pulse: 62 (07/15/24 1500)  Resp: 18 (07/15/24 1500)  BP: 123/65 (07/15/24 1500)  SpO2: (!) 94 % (07/15/24 1500) Vital  Signs (24h Range):  Temp:  [97.1 °F (36.2 °C)-101.6 °F (38.7 °C)] 99.4 °F (37.4 °C)  Pulse:  [45-81] 62  Resp:  [18-20] 18  SpO2:  [92 %-97 %] 94 %  BP: (111-136)/(60-70) 123/65     Weight: 124.6 kg (274 lb 9.6 oz)  Body mass index is 43.01 kg/m².    Intake/Output Summary (Last 24 hours) at 7/15/2024 1543  Last data filed at 7/15/2024 1200  Gross per 24 hour   Intake 3346.25 ml   Output --   Net 3346.25 ml         Physical Exam  Constitutional:       General: He is not in acute distress.     Appearance: Normal appearance. He is normal weight. He is not ill-appearing.   HENT:      Head: Normocephalic and atraumatic.      Nose: Nose normal.   Eyes:      General: No scleral icterus.     Conjunctiva/sclera: Conjunctivae normal.   Cardiovascular:      Rate and Rhythm: Normal rate and regular rhythm.      Pulses: Normal pulses.      Heart sounds: Normal heart sounds.   Pulmonary:      Effort: Pulmonary effort is normal.      Breath sounds: Normal breath sounds.   Abdominal:      General: Abdomen is flat. Bowel sounds are normal.      Palpations: Abdomen is soft.   Skin:     General: Skin is warm and dry.      Findings: No erythema or rash.   Neurological:      General: No focal deficit present.      Mental Status: He is alert and oriented to person, place, and time.   Psychiatric:         Mood and Affect: Mood normal.         Behavior: Behavior normal.         Thought Content: Thought content normal.             Significant Labs: All pertinent labs within the past 24 hours have been reviewed.  CBC:   Recent Labs   Lab 07/13/24 1909 07/14/24  0902 07/15/24  0502   WBC 11.94* 8.57 9.55   HGB 14.1 12.5* 13.5   HCT 40.0 35.9* 37.9    149 163     CMP:   Recent Labs   Lab 07/13/24 1909 07/14/24  0902 07/15/24  0502   * 134* 136   K 3.0* 3.1* 3.5   CL 97* 102 101   CO2 27 26 27   BUN 19 15 13   CREATININE 1.14 0.91 0.69   CALCIUM 9.3 8.5 8.7   ALBUMIN 4.2  --   --    BILITOT 1.1*  --   --    ALKPHOS 68  --   --     AST 31  --   --    ALT 27  --   --        Significant Imaging: I have reviewed all pertinent imaging results/findings within the past 24 hours.    Assessment/Plan:      Tobacco dependency  Dangers of cigarette smoking were reviewed with patient in detail. Patient was Counseled for 3-10 minutes. Nicotine replacement options were discussed. Nicotine replacement was discussed- not prescribed per patient's request    HTN (hypertension)  Chronic, controlled. Latest blood pressure and vitals reviewed-     Temp:  [97.1 °F (36.2 °C)-101.6 °F (38.7 °C)]   Pulse:  [45-81]   Resp:  [18-20]   BP: (111-136)/(60-70)   SpO2:  [92 %-97 %] .   Home meds for hypertension were reviewed and noted below.   Hypertension Medications               amLODIPine (NORVASC) 10 MG tablet Take 10 mg by mouth once daily.    lisinopriL-hydrochlorothiazide (PRINZIDE,ZESTORETIC) 20-25 mg Tab Take 1 tablet by mouth once daily.    metoprolol tartrate (LOPRESSOR) 50 MG tablet Take 50 mg by mouth 2 (two) times daily.            While in the hospital, will manage blood pressure as follows; Continue home antihypertensive regimen    Will utilize p.r.n. blood pressure medication only if patient's blood pressure greater than 140/90 and he develops symptoms such as worsening chest pain or shortness of breath.    Hypokalemia  Patient has hypokalemia which is Acute and currently improving. Most recent potassium levels reviewed-   Lab Results   Component Value Date    K 3.5 07/15/2024   . Will continue potassium replacement per protocol and recheck repeat levels after replacement completed.     UTI (urinary tract infection)  Still with fever  WBC coming down and urine cx negative so far  No other symtpoms  Continue rocephin if additional fever will repeat blood cx and consider change iv abx        VTE Risk Mitigation (From admission, onward)           Ordered     apixaban tablet 5 mg  2 times daily         07/15/24 0545     IP VTE HIGH RISK PATIENT  Once          07/13/24 2247                    Discharge Planning   NANCY: 7/17/2024     Code Status: Full Code   Is the patient medically ready for discharge?:     Reason for patient still in hospital (select all that apply): Patient trending condition, Laboratory test, and Treatment  Discharge Plan A: Home                  Nicole Colón MD  Department of Hospital Medicine   [facility]

## 2024-07-15 NOTE — CONSULTS
[facility]  Cardiology  Consult Note    Patient Name: Jonathan Castro  MRN: [id number]  Admission Date: 7/13/2024  Hospital Length of Stay: 2 days  Code Status: Full Code   Attending Provider: Dr Das  Consulting Provider: Minor Lin MD  Primary Care Physician: Dave Gomez MD  Principal Problem:<principal problem not specified>    Patient information was obtained from patient, MD notes.     Consults  Subjective:     68-year-old gentleman, local , personal history of hypertension, coronary angiogram 2022-normal vessels, BMI 43, dysuria, malaise fatigue, subjective fever at home for about 2 weeks.  Patient was admitted with UTI/urosepsis/covered empiric IV antibiotics/pan cultures.  Cardiology consulted for new onset of atrial fibrillation-rate control.  No reported symptomatic palpitation, PND, orthopnea, chest pain, syncope.      UTI/urosepsis   New onset atrial fibrillation  Hypokalemia   Essential hypertension   History of preserved LV function per echo  2022 coronary angiogram -normal vessels   BMI 43            Past Medical History:   Diagnosis Date    Hypercholesteremia     Hypertension     Sleep apnea, unspecified        Past Surgical History:   Procedure Laterality Date    ANGIOGRAM, [place]      CATARACT EXTRACTION      PROSTATECTOMY         Review of patient's allergies indicates:  No Known Allergies    No current facility-administered medications on file prior to encounter.     Current Outpatient Medications on File Prior to Encounter   Medication Sig    amLODIPine (NORVASC) 10 MG tablet Take 10 mg by mouth once daily.    calcium-vitamin D3 (OS-BRAXTON 500 + D3) 500 mg-5 mcg (200 unit) per tablet Take 1 tablet by mouth 2 (two) times daily with meals.    desvenlafaxine succinate (PRISTIQ) 100 MG Tb24 Take 200 mg by mouth nightly.    lisinopriL-hydrochlorothiazide (PRINZIDE,ZESTORETIC) 20-25 mg Tab Take 1 tablet by mouth once daily.    meloxicam (MOBIC) 15 MG tablet Take 15 mg by  mouth once daily.    metoprolol tartrate (LOPRESSOR) 50 MG tablet Take 50 mg by mouth 2 (two) times daily.    phentermine 37.5 MG capsule Take 37.5 mg by mouth every morning.     Family History    None       Tobacco Use    Smoking status: Some Days     Types: Cigars     Passive exposure: Never    Smokeless tobacco: Never   Substance and Sexual Activity    Alcohol use: Yes     Comment: occasional    Drug use: Never    Sexual activity: Not on file     Review of Systems   Constitutional: Positive for fever and malaise/fatigue.   HENT: Negative.     Eyes: Negative.    Cardiovascular: Negative.    Respiratory: Negative.     Endocrine: Negative.    Hematologic/Lymphatic: Negative.    Skin: Negative.    Musculoskeletal:  Positive for arthritis.   Gastrointestinal: Negative.    Genitourinary:  Positive for dysuria.   Neurological:  Positive for weakness.   Psychiatric/Behavioral: Negative.     Allergic/Immunologic: Negative.      Objective:     Vital Signs (Most Recent):  Temp: 98.8 °F (37.1 °C) (07/15/24 1045)  Pulse: 68 (07/15/24 1045)  Resp: 18 (07/15/24 1045)  BP: 119/64 (07/15/24 1045)  SpO2: 95 % (07/15/24 1045) Vital Signs (24h Range):  Temp:  [97.1 °F (36.2 °C)-101.6 °F (38.7 °C)] 98.8 °F (37.1 °C)  Pulse:  [45-81] 68  Resp:  [18-20] 18  SpO2:  [92 %-97 %] 95 %  BP: (111-138)/(60-73) 119/64     Weight: 124.6 kg (274 lb 9.6 oz)  Body mass index is 43.01 kg/m².    SpO2: 95 %         Intake/Output Summary (Last 24 hours) at 7/15/2024 1258  Last data filed at 7/15/2024 0558  Gross per 24 hour   Intake 2386.25 ml   Output --   Net 2386.25 ml       Lines/Drains/Airways       Peripheral Intravenous Line  Duration                  Peripheral IV - Single Lumen 07/14/24 1228 22 G Anterior;Right Forearm 1 day                     Physical Exam  Constitutional:       General: He is not in acute distress.  HENT:      Head: Normocephalic and atraumatic.      Nose: No rhinorrhea.      Mouth/Throat:      Mouth: Mucous membranes are  moist.   Eyes:      Extraocular Movements: Extraocular movements intact.      Pupils: Pupils are equal, round, and reactive to light.   Cardiovascular:      Rate and Rhythm: Irregular, irregular, + soft JAD lsb. No JVD  Pulmonary:      Effort: Pulmonary effort is normal.      Breath sounds: Normal breath sounds.   Abdominal:      Palpations: Abdomen is soft.      Tenderness: There is no abdominal tenderness.   Musculoskeletal:      Cervical back: Neck supple.      Right lower leg: mild  Edema present.      Left lower leg: mild Edema present.   Skin:     General: Skin is warm and dry.   Neurological:      General: No focal deficit present.   Psychiatric:         Mood and Affect: Mood normal.         Behavior: Behavior normal.          Significant Labs: CMP   Recent Labs   Lab 07/13/24 1909 07/14/24  0902 07/15/24  0502   * 134* 136   K 3.0* 3.1* 3.5   CL 97* 102 101   CO2 27 26 27   BUN 19 15 13   CREATININE 1.14 0.91 0.69   CALCIUM 9.3 8.5 8.7   ALBUMIN 4.2  --   --    BILITOT 1.1*  --   --    ALKPHOS 68  --   --    AST 31  --   --    ALT 27  --   --     and CBC   Recent Labs   Lab 07/13/24 1909 07/14/24  0902 07/15/24  0502   WBC 11.94* 8.57 9.55   HGB 14.1 12.5* 13.5   HCT 40.0 35.9* 37.9    149 163       EKG afib   Pcxr - no lobar infiltrate    Assessment and Plan:       UTI/urosepsis   New onset atrial fibrillation  Hypokalemia   Essential hypertension   History of preserved LV function per echo  2022 coronary angiogram -normal vessels   BMI 43    Rate controll afib  OAC - Eliquis started  K Supplement  Check magnesium level  Off HCTZ   Post D/C follow up     Appreciate Dr Das help , consult        Minor Lin MD  Cardiology   [facility]

## 2024-07-16 VITALS
SYSTOLIC BLOOD PRESSURE: 129 MMHG | TEMPERATURE: 98 F | OXYGEN SATURATION: 97 % | RESPIRATION RATE: 20 BRPM | HEIGHT: 67 IN | DIASTOLIC BLOOD PRESSURE: 76 MMHG | HEART RATE: 64 BPM | WEIGHT: 274.63 LBS | BODY MASS INDEX: 43.1 KG/M2

## 2024-07-16 PROBLEM — F17.200 TOBACCO DEPENDENCY: Status: RESOLVED | Noted: 2024-07-13 | Resolved: 2024-07-16

## 2024-07-16 LAB
ANION GAP SERPL CALC-SCNC: 8 MEQ/L (ref 2–13)
BACTERIA UR CULT: NO GROWTH
BASOPHILS # BLD AUTO: 0.03 X10(3)/MCL (ref 0.01–0.08)
BASOPHILS NFR BLD AUTO: 0.3 % (ref 0.1–1.2)
BUN SERPL-MCNC: 13 MG/DL (ref 7–20)
CALCIUM SERPL-MCNC: 9.2 MG/DL (ref 8.4–10.2)
CHLORIDE SERPL-SCNC: 98 MMOL/L (ref 98–110)
CO2 SERPL-SCNC: 29 MMOL/L (ref 21–32)
CREAT SERPL-MCNC: 0.78 MG/DL (ref 0.66–1.25)
CREAT/UREA NIT SERPL: 17 (ref 12–20)
EOSINOPHIL # BLD AUTO: 0.21 X10(3)/MCL (ref 0.04–0.54)
EOSINOPHIL NFR BLD AUTO: 2.2 % (ref 0.7–7)
ERYTHROCYTE [DISTWIDTH] IN BLOOD BY AUTOMATED COUNT: 14.1 %
GFR SERPLBLD CREATININE-BSD FMLA CKD-EPI: >90 ML/MIN/1.73/M2
GLUCOSE SERPL-MCNC: 118 MG/DL (ref 70–115)
HCT VFR BLD AUTO: 37.6 % (ref 36–52)
HGB BLD-MCNC: 12.8 G/DL (ref 13–18)
IMM GRANULOCYTES # BLD AUTO: 0.03 X10(3)/MCL (ref 0–0.03)
IMM GRANULOCYTES NFR BLD AUTO: 0.3 % (ref 0–0.5)
LYMPHOCYTES # BLD AUTO: 1.71 X10(3)/MCL (ref 1.32–3.57)
LYMPHOCYTES NFR BLD AUTO: 18 % (ref 20–55)
MCH RBC QN AUTO: 29.4 PG (ref 27–34)
MCHC RBC AUTO-ENTMCNC: 34 G/DL (ref 31–37)
MCV RBC AUTO: 86.2 FL (ref 79–99)
MONOCYTES # BLD AUTO: 0.98 X10(3)/MCL (ref 0.3–0.82)
MONOCYTES NFR BLD AUTO: 10.3 % (ref 4.7–12.5)
NEUTROPHILS # BLD AUTO: 6.56 X10(3)/MCL (ref 1.78–5.38)
NEUTROPHILS NFR BLD AUTO: 68.9 % (ref 37–73)
NRBC BLD AUTO-RTO: 0 %
PLATELET # BLD AUTO: 158 X10(3)/MCL (ref 140–371)
PMV BLD AUTO: 9.6 FL (ref 9.4–12.4)
POTASSIUM SERPL-SCNC: 3.4 MMOL/L (ref 3.5–5.1)
RBC # BLD AUTO: 4.36 X10(6)/MCL (ref 4–6)
SODIUM SERPL-SCNC: 135 MMOL/L (ref 136–145)
WBC # BLD AUTO: 9.52 X10(3)/MCL (ref 4–11.5)

## 2024-07-16 PROCEDURE — 25000003 PHARM REV CODE 250: Performed by: INTERNAL MEDICINE

## 2024-07-16 PROCEDURE — 27000221 HC OXYGEN, UP TO 24 HOURS

## 2024-07-16 PROCEDURE — 94761 N-INVAS EAR/PLS OXIMETRY MLT: CPT

## 2024-07-16 PROCEDURE — 25000003 PHARM REV CODE 250: Performed by: FAMILY MEDICINE

## 2024-07-16 PROCEDURE — 36415 COLL VENOUS BLD VENIPUNCTURE: CPT | Performed by: FAMILY MEDICINE

## 2024-07-16 PROCEDURE — 85025 COMPLETE CBC W/AUTO DIFF WBC: CPT | Performed by: FAMILY MEDICINE

## 2024-07-16 PROCEDURE — 80048 BASIC METABOLIC PNL TOTAL CA: CPT | Performed by: FAMILY MEDICINE

## 2024-07-16 RX ORDER — CEFDINIR 300 MG/1
300 CAPSULE ORAL 2 TIMES DAILY
Qty: 14 CAPSULE | Refills: 0 | Status: SHIPPED | OUTPATIENT
Start: 2024-07-16 | End: 2024-07-23

## 2024-07-16 RX ORDER — LISINOPRIL 20 MG/1
20 TABLET ORAL DAILY
Qty: 90 TABLET | Refills: 3 | Status: SHIPPED | OUTPATIENT
Start: 2024-07-17 | End: 2025-07-17

## 2024-07-16 RX ADMIN — AMLODIPINE BESYLATE 10 MG: 5 TABLET ORAL at 08:07

## 2024-07-16 RX ADMIN — LISINOPRIL 20 MG: 10 TABLET ORAL at 08:07

## 2024-07-16 RX ADMIN — APIXABAN 5 MG: 5 TABLET, FILM COATED ORAL at 08:07

## 2024-07-16 RX ADMIN — MELOXICAM 15 MG: 7.5 TABLET ORAL at 08:07

## 2024-07-16 RX ADMIN — ACETAMINOPHEN 650 MG: 325 TABLET, FILM COATED ORAL at 04:07

## 2024-07-16 RX ADMIN — METOPROLOL TARTRATE 50 MG: 50 TABLET, FILM COATED ORAL at 08:07

## 2024-07-16 RX ADMIN — Medication 1 TABLET: at 08:07

## 2024-07-16 NOTE — PLAN OF CARE
Problem: Adult Inpatient Plan of Care  Goal: Plan of Care Review  Outcome: Progressing  Goal: Patient-Specific Goal (Individualized)  Outcome: Progressing  Goal: Absence of Hospital-Acquired Illness or Injury  Outcome: Progressing  Goal: Optimal Comfort and Wellbeing  Outcome: Progressing  Goal: Readiness for Transition of Care  Outcome: Progressing     Problem: Bariatric Environmental Safety  Goal: Safety Maintained with Care  Outcome: Progressing     Problem: Fall Injury Risk  Goal: Absence of Fall and Fall-Related Injury  Outcome: Progressing     Problem: UTI (Urinary Tract Infection)  Goal: Improved Infection Symptoms  Outcome: Progressing     Problem: Comorbidity Management  Goal: Blood Pressure in Desired Range  Outcome: Progressing     Problem: Dysrhythmia  Goal: Normalized Cardiac Rhythm  Outcome: Progressing

## 2024-07-16 NOTE — SUBJECTIVE & OBJECTIVE
ROS  Objective:     Vital Signs (Most Recent):  Temp: 98.4 °F (36.9 °C) (07/16/24 0710)  Pulse: 64 (07/16/24 0833)  Resp: 20 (07/16/24 0733)  BP: 129/76 (07/16/24 0833)  SpO2: 97 % (07/16/24 0710) Vital Signs (24h Range):  Temp:  [98.1 °F (36.7 °C)-99.4 °F (37.4 °C)] 98.4 °F (36.9 °C)  Pulse:  [60-67] 64  Resp:  [18-22] 20  SpO2:  [94 %-97 %] 97 %  BP: (123-143)/(54-76) 129/76     Weight: 124.6 kg (274 lb 9.6 oz)  Body mass index is 43.01 kg/m².     SpO2: 97 %         Intake/Output Summary (Last 24 hours) at 7/16/2024 1405  Last data filed at 7/16/2024 0800  Gross per 24 hour   Intake 2659.16 ml   Output --   Net 2659.16 ml       Lines/Drains/Airways       None                      Physical Exam       Significant Labs: CMP   Recent Labs   Lab 07/15/24  0502 07/16/24  0440    135*   K 3.5 3.4*    98   CO2 27 29   BUN 13 13   CREATININE 0.69 0.78   CALCIUM 8.7 9.2    and CBC   Recent Labs   Lab 07/15/24  0502 07/16/24  0440   WBC 9.55 9.52   HGB 13.5 12.8*   HCT 37.9 37.6    158

## 2024-07-16 NOTE — DISCHARGE SUMMARY
Hospital Medicine  Discharge Summary    Patient Name: Jonathan Castro  MRN: [id number]  Admit Date: 7/13/2024  Discharge Date:  07/16/2024  Status: IP- Inpatient   Length of Stay: 3      PHYSICIANS   Admitting Physician: Damon Boyd MD  Discharging Physician: Yoni Das MD.  Primary Care Physician: Dave Gomez MD        DISCHARGE DIAGNOSES   HTN (hypertension)  Chronic, controlled. Latest blood pressure and vitals reviewed-      Temp:  [97.1 °F (36.2 °C)-101.6 °F (38.7 °C)]   Pulse:  [45-81]   Resp:  [18-20]   BP: (111-136)/(60-70)   SpO2:  [92 %-97 %] .   Home meds for hypertension were reviewed and noted below.   Hypertension Medications                    amLODIPine (NORVASC) 10 MG tablet Take 10 mg by mouth once daily.     lisinopriL-hydrochlorothiazide (PRINZIDE,ZESTORETIC) 20-25 mg Tab Take 1 tablet by mouth once daily.     metoprolol tartrate (LOPRESSOR) 50 MG tablet Take 50 mg by mouth 2 (two) times daily.                While in the hospital, will manage blood pressure as follows; Continue home antihypertensive regimen     Will utilize p.r.n. blood pressure medication only if patient's blood pressure greater than 140/90 and he develops symptoms such as worsening chest pain or shortness of breath.     Hypokalemia  Patient has hypokalemia which is Acute and currently improving. Most recent potassium levels reviewed-         Lab Results   Component Value Date     K 3.5 07/15/2024   . Will continue potassium replacement per protocol and recheck repeat levels after replacement completed.      UTI (urinary tract infection)  Still with fever  WBC coming down and urine cx negative so far  No other symtpoms  Continue rocephin if additional fever will repeat blood cx and consider change iv abx           Atrial fibrillation      PROCEDURES   * No surgery found *         HOSPITAL COURSE      68-year-old man with a history of prostatectomy who presents with fever and hypotension. Blood pressure was  initially 80s and 90s cystic on admission. He did get 2 L of fluids with improved his vital signs. Patient has found to have a significant urinary tract infection. Chest x-ray and lactic acid are both negative. He does complain of increased and continence associated with the UTI. He did get Rocephin and gentamycin in the emergency department. Currently feels more comfortable.            Overview/Hospital Course:  07/14/2024  BP better   Culture pending  On iv rocephin for UTI     07/15/2024 admitted with UTI, 24 hr urnine negative, UA looks abnormal, still running fever early this am, now afebrile.    Has had 2 doses of rocephin     07/16/2024  Stable for d/c home today   Will cont po course abx  Stopped hctz  Cont lisinopril  Eliquis 5 mg bid sent to pharmacy  Will follow up dr gómez in cardiology clinic in 1 week     STATUS  ImprovedStable    DISPOSITION  Discharge to home     DIET  Cardiac     ACTIVITY  As tolerated      FOLLOW-UP         DISCHARGE MEDICATION RECONCILIATION        Medication List        START taking these medications      cefdinir 300 MG capsule  Commonly known as: OMNICEF  Take 1 capsule (300 mg total) by mouth 2 (two) times daily. for 7 days            CONTINUE taking these medications      amLODIPine 10 MG tablet  Commonly known as: NORVASC     atorvastatin 10 MG tablet  Commonly known as: LIPITOR     calcium-vitamin D3 500 mg-5 mcg (200 unit) per tablet  Commonly known as: OS-BRAXTON 500 + D3     desvenlafaxine succinate 100 MG Tb24  Commonly known as: PRISTIQ     lisinopriL-hydrochlorothiazide 20-25 mg Tab  Commonly known as: PRINZIDE,ZESTORETIC     meloxicam 15 MG tablet  Commonly known as: MOBIC     metoprolol tartrate 50 MG tablet  Commonly known as: LOPRESSOR     phentermine 37.5 MG capsule               Where to Get Your Medications        These medications were sent to [facility] [address] [place] [address]      Phone: [phone]  "  cefdinir 300 MG capsule           PHYSICAL EXAM   VITALS: T 98.4 °F (36.9 °C)   /76   P 64   RR 20   O2 97 %    Physical Exam  Vitals reviewed.   HENT:      Head: Normocephalic.   Cardiovascular:      Rate and Rhythm: Normal rate.   Pulmonary:      Effort: Pulmonary effort is normal.   Neurological:      Mental Status: He is alert.              DIAGNOSITCS   CBC:   Recent Labs   Lab 07/14/24  0902 07/15/24  0502 07/16/24  0440   WBC 8.57 9.55 9.52   HGB 12.5* 13.5 12.8*   HCT 35.9* 37.9 37.6    163 158       CMP:   Recent Labs   Lab 07/13/24  1909 07/14/24  0902 07/15/24  0502 07/16/24  0440   CALCIUM 9.3 8.5 8.7 9.2   ALBUMIN 4.2  --   --   --    * 134* 136 135*   K 3.0* 3.1* 3.5 3.4*   CO2 27 26 27 29   CL 97* 102 101 98   BUN 19 15 13 13   CREATININE 1.14 0.91 0.69 0.78   ALKPHOS 68  --   --   --    ALT 27  --   --   --    AST 31  --   --   --    BILITOT 1.1*  --   --   --    MG  --   --  1.90  --      Estimated Creatinine Clearance: 111.6 mL/min (based on SCr of 0.78 mg/dL).    Labs within the past 24 hours have been reviewed.     COAG:  No results for input(s): "APTT", "INR", "PTT" in the last 168 hours.    CARDIAC ENZYMES: No results for input(s): "TROPONINI", "CPK", "CKMB" in the last 72 hours.     No results for input(s): "AMYLASE", "LIPASE" in the last 168 hours.    No results for input(s): "POCTGLUCOSE" in the last 72 hours.     Microbiology Results (last 7 days)       Procedure Component Value Units Date/Time    Urine culture [[id number]] Collected: 07/13/24 2125    Order Status: Completed Specimen: Urine Updated: 07/16/24 0916     Urine Culture No Growth    Blood Culture [[id number]] Collected: 07/13/24 1957    Order Status: Completed Specimen: Blood from Ankle, Left Updated: 07/15/24 2100     Blood Culture No Growth At 48 Hours    Blood Culture [[id number]] Collected: 07/13/24 1957    Order Status: Completed Specimen: Blood from Arm, Left Updated: 07/15/24 2100     Blood Culture No " "Growth At 48 Hours             No results for input(s): "CHOL", "TRIG", "HDL", "LDL" in the last 72 hours. No results found for: "LABA1C", "HGBA1C"     X-Ray Chest AP Portable    Result Date: 7/13/2024  EXAMINATION: XR CHEST AP PORTABLE CLINICAL HISTORY: FEVER W/ HYPOXIA; TECHNIQUE: Single frontal view of the chest was performed. COMPARISON: 07/21/2022 FINDINGS: The lungs are under expanded and there is exaggeration to the interstitial markings and the cardiac silhouette as well as the pulmonary vasculature.No focal, lobar or segmental infiltrate or significant pleural effusion or pneumothorax is identified. Moderate degenerative findings involve the spine.     Shallow depth of inspiration exaggerating the interstitial markings, the cardiac silhouette as well as the pulmonary vasculature.  This also precludes the exclusion of mild interstitial edema. Electronically signed by: Thomas Ritter Date:    07/13/2024 Time:    20:24      N/A     Patient Screened for food insecurity, housing instability, transportation needs, utility difficulties, and interpersonal safety.  No needs identified    Discharge time: 33 minutes         Yoni Das MD  [facility] Medicine      "

## 2024-07-16 NOTE — PROGRESS NOTES
[facility]-Med/Surg  Cardiology  Progress Note    Patient Name: Jonathan Castro  MRN: [id number]  Admission Date: 7/13/2024  Hospital Length of Stay: 3 days  Code Status: Full Code   Attending Physician: No att. providers found   Primary Care Physician: Dave Gomez MD  Expected Discharge Date: 7/16/2024  Principal Problem:UTI (urinary tract infection)    Subjective:     68-year-old gentleman, local , personal history of hypertension, coronary angiogram 2022-normal vessels, BMI 43, dysuria, malaise fatigue, subjective fever at home for about 2 weeks.  Patient was admitted with UTI/urosepsis/covered empiric IV antibiotics/pan cultures.  Cardiology consulted for new onset of atrial fibrillation-rate control.  No reported symptomatic palpitation, PND, orthopnea, chest pain, syncope.      Telemetry - now in sinus       UTI/urosepsis   New onset atrial fibrillation  Hypokalemia   Essential hypertension   History of preserved LV function per echo  2022 coronary angiogram -normal vessels   BMI 43              Objective:     Vital Signs (Most Recent):  Temp: 98.4 °F (36.9 °C) (07/16/24 0710)  Pulse: 64 (07/16/24 0833)  Resp: 20 (07/16/24 0733)  BP: 129/76 (07/16/24 0833)  SpO2: 97 % (07/16/24 0710) Vital Signs (24h Range):  Temp:  [98.1 °F (36.7 °C)-99.4 °F (37.4 °C)] 98.4 °F (36.9 °C)  Pulse:  [60-67] 64  Resp:  [18-22] 20  SpO2:  [94 %-97 %] 97 %  BP: (123-143)/(54-76) 129/76     Weight: 124.6 kg (274 lb 9.6 oz)  Body mass index is 43.01 kg/m².     SpO2: 97 %         Intake/Output Summary (Last 24 hours) at 7/16/2024 1405  Last data filed at 7/16/2024 0800  Gross per 24 hour   Intake 2659.16 ml   Output --   Net 2659.16 ml            Physical Exam  Constitutional:       General: He is not in acute distress.  HENT:      Head: Normocephalic and atraumatic.      Nose: No rhinorrhea.      Mouth/Throat:      Mouth: Mucous membranes are moist.   Eyes:      Extraocular Movements: Extraocular movements intact.       Pupils: Pupils are equal, round, and reactive to light.   Cardiovascular:      Rate and Rhythm: RRR+ soft JAD lsb. No JVD  Pulmonary:      Effort: Pulmonary effort is normal.      Breath sounds: Normal breath sounds.   Abdominal:      Palpations: Abdomen is soft.      Tenderness: There is no abdominal tenderness.   Musculoskeletal:      Cervical back: Neck supple.      Right lower leg: mild  Edema present.      Left lower leg: mild Edema present.   Skin:     General: Skin is warm and dry.   Neurological:      General: No focal deficit present.   Psychiatric:         Mood and Affect: Mood normal.         Behavior: Behavior normal.        Significant Labs: CMP   Recent Labs   Lab 07/15/24  0502 07/16/24  0440    135*   K 3.5 3.4*    98   CO2 27 29   BUN 13 13   CREATININE 0.69 0.78   CALCIUM 8.7 9.2    and CBC   Recent Labs   Lab 07/15/24  0502 07/16/24  0440   WBC 9.55 9.52   HGB 13.5 12.8*   HCT 37.9 37.6    158       EKG afib   Pcxr - no lobar infiltrate       Assessment and Plan:       UTI/urosepsis   New onset atrial fibrillation-> converted to sinus   Hypokalemia   Essential hypertension   History of preserved LV function per echo  2022 coronary angiogram -normal vessels   BMI 43     Continue BB  OAC - Eliquis   K Supplement  Check magnesium level  Off HCTZ   Post D/C follow up      Appreciate Dr Das help , consult           Minor Lin MD  Cardiology  [facility]-Med/Surg

## 2024-07-16 NOTE — PLAN OF CARE
Problem: Adult Inpatient Plan of Care  Goal: Plan of Care Review  Outcome: Met  Goal: Patient-Specific Goal (Individualized)  Outcome: Met  Goal: Absence of Hospital-Acquired Illness or Injury  Outcome: Met  Goal: Optimal Comfort and Wellbeing  Outcome: Met  Goal: Readiness for Transition of Care  Outcome: Met     Problem: Bariatric Environmental Safety  Goal: Safety Maintained with Care  Outcome: Met     Problem: Fall Injury Risk  Goal: Absence of Fall and Fall-Related Injury  Outcome: Met     Problem: UTI (Urinary Tract Infection)  Goal: Improved Infection Symptoms  Outcome: Met     Problem: Comorbidity Management  Goal: Blood Pressure in Desired Range  Outcome: Met     Problem: Dysrhythmia  Goal: Normalized Cardiac Rhythm  Outcome: Met

## 2024-07-16 NOTE — PLAN OF CARE
07/16/24 1002   Final Note   Assessment Type Final Discharge Note   Anticipated Discharge Disposition Home   What phone number can be called within the next 1-3 days to see how you are doing after discharge? [phone]   Hospital Resources/Appts/Education Provided Provided patient/caregiver with written discharge plan information   Post-Acute Status   Discharge Delays None known at this time

## 2024-07-18 LAB — BACTERIA BLD CULT: NORMAL

## 2024-07-19 LAB — BACTERIA BLD CULT: NORMAL

## 2024-10-21 PROBLEM — N39.0 UTI (URINARY TRACT INFECTION): Status: RESOLVED | Noted: 2024-07-13 | Resolved: 2024-10-21

## 2024-11-15 ENCOUNTER — HOSPITAL ENCOUNTER (OUTPATIENT)
Dept: RADIOLOGY | Facility: HOSPITAL | Age: 69
Discharge: HOME | End: 2024-11-15
Attending: INTERNAL MEDICINE
Payer: MEDICARE

## 2024-11-15 DIAGNOSIS — M25.461 SWELLING OF RIGHT KNEE JOINT: ICD-10-CM

## 2024-11-15 DIAGNOSIS — M25.571 RIGHT ANKLE PAIN: ICD-10-CM

## 2024-11-15 DIAGNOSIS — M25.561 RIGHT KNEE PAIN: ICD-10-CM

## 2024-11-15 PROCEDURE — 76881 US COMPL JOINT R-T W/IMG: CPT | Mod: TC,RT

## 2024-11-15 PROCEDURE — 73562 X-RAY EXAM OF KNEE 3: CPT | Mod: TC,RT

## 2024-11-15 PROCEDURE — 73610 X-RAY EXAM OF ANKLE: CPT | Mod: TC,RT

## 2024-11-15 PROCEDURE — 93970 EXTREMITY STUDY: CPT | Mod: TC

## 2025-03-03 ENCOUNTER — APPOINTMENT (OUTPATIENT)
Dept: LAB | Facility: HOSPITAL | Age: 70
End: 2025-03-03
Attending: INTERNAL MEDICINE
Payer: MEDICARE

## 2025-03-03 DIAGNOSIS — N39.0 URINARY TRACT INFECTION, SITE NOT SPECIFIED: Primary | ICD-10-CM

## 2025-03-03 LAB
BACTERIA #/AREA URNS AUTO: ABNORMAL /HPF
BILIRUB UR QL STRIP.AUTO: NEGATIVE
CLARITY UR: CLEAR
COLOR UR AUTO: YELLOW
GLUCOSE UR QL STRIP: NEGATIVE
HGB UR QL STRIP: ABNORMAL
HYALINE CASTS URNS QL MICRO: ABNORMAL /LPF
KETONES UR QL STRIP: ABNORMAL
LEUKOCYTE ESTERASE UR QL STRIP: ABNORMAL
NITRITE UR QL STRIP: POSITIVE
PH UR STRIP: 6 [PH]
PROT UR QL STRIP: 30
RBC #/AREA URNS AUTO: ABNORMAL /HPF
SP GR UR STRIP.AUTO: 1.02 (ref 1–1.03)
SQUAMOUS #/AREA URNS AUTO: ABNORMAL /HPF
UROBILINOGEN UR STRIP-ACNC: 0.2
WBC #/AREA URNS AUTO: ABNORMAL /HPF

## 2025-03-03 PROCEDURE — 87086 URINE CULTURE/COLONY COUNT: CPT

## 2025-03-03 PROCEDURE — 81003 URINALYSIS AUTO W/O SCOPE: CPT

## 2025-03-03 PROCEDURE — 81015 MICROSCOPIC EXAM OF URINE: CPT

## 2025-03-05 LAB — BACTERIA UR CULT: ABNORMAL
